# Patient Record
Sex: FEMALE | Race: AMERICAN INDIAN OR ALASKA NATIVE
[De-identification: names, ages, dates, MRNs, and addresses within clinical notes are randomized per-mention and may not be internally consistent; named-entity substitution may affect disease eponyms.]

---

## 2017-06-20 ENCOUNTER — HOSPITAL ENCOUNTER (EMERGENCY)
Dept: HOSPITAL 5 - ED | Age: 32
LOS: 1 days | Discharge: LEFT BEFORE DISCHARGE | End: 2017-06-21
Payer: SELF-PAY

## 2017-06-20 VITALS — DIASTOLIC BLOOD PRESSURE: 88 MMHG | SYSTOLIC BLOOD PRESSURE: 137 MMHG

## 2017-06-20 DIAGNOSIS — R41.9: ICD-10-CM

## 2017-06-20 DIAGNOSIS — Z53.21: ICD-10-CM

## 2017-06-20 DIAGNOSIS — R11.2: ICD-10-CM

## 2017-06-20 DIAGNOSIS — R10.9: Primary | ICD-10-CM

## 2017-06-20 LAB
ALBUMIN SERPL-MCNC: 4.2 G/DL (ref 3.9–5)
ALBUMIN/GLOB SERPL: 1.3 %
ALP SERPL-CCNC: 38 UNITS/L (ref 35–129)
ALT SERPL-CCNC: 6 UNITS/L (ref 7–56)
ANION GAP SERPL CALC-SCNC: 18 MMOL/L
BASOPHILS NFR BLD AUTO: 0.3 % (ref 0–1.8)
BILIRUB SERPL-MCNC: 0.4 MG/DL (ref 0.1–1.2)
BILIRUB UR QL STRIP: (no result)
BLOOD UR QL VISUAL: (no result)
BUN SERPL-MCNC: 8 MG/DL (ref 7–17)
BUN/CREAT SERPL: 13.33 %
CALCIUM SERPL-MCNC: 9.3 MG/DL (ref 8.4–10.2)
CHLORIDE SERPL-SCNC: 97.6 MMOL/L (ref 98–107)
CO2 SERPL-SCNC: 24 MMOL/L (ref 22–30)
EOSINOPHIL NFR BLD AUTO: 2.1 % (ref 0–4.3)
GLUCOSE SERPL-MCNC: 107 MG/DL (ref 65–100)
HCT VFR BLD CALC: 35 % (ref 30.3–42.9)
HGB BLD-MCNC: 11.3 GM/DL (ref 10.1–14.3)
KETONES UR STRIP-MCNC: 20 MG/DL
LEUKOCYTE ESTERASE UR QL STRIP: (no result)
LIPASE SERPL-CCNC: 39 UNITS/L (ref 13–60)
MCH RBC QN AUTO: 26 PG (ref 28–32)
MCHC RBC AUTO-ENTMCNC: 32 % (ref 30–34)
MCV RBC AUTO: 81 FL (ref 79–97)
MUCOUS THREADS #/AREA URNS HPF: (no result) /HPF
NITRITE UR QL STRIP: (no result)
PH UR STRIP: 6 [PH] (ref 5–7)
PLATELET # BLD: 209 K/MM3 (ref 140–440)
POTASSIUM SERPL-SCNC: 3.5 MMOL/L (ref 3.6–5)
PROT SERPL-MCNC: 7.5 G/DL (ref 6.3–8.2)
PROT UR STRIP-MCNC: (no result) MG/DL
RBC # BLD AUTO: 4.34 M/MM3 (ref 3.65–5.03)
RBC #/AREA URNS HPF: 7 /HPF (ref 0–6)
SODIUM SERPL-SCNC: 136 MMOL/L (ref 137–145)
UROBILINOGEN UR-MCNC: 2 MG/DL (ref ?–2)
WBC # BLD AUTO: 6.4 K/MM3 (ref 4.5–11)
WBC #/AREA URNS HPF: 2 /HPF (ref 0–6)

## 2017-06-20 PROCEDURE — 76705 ECHO EXAM OF ABDOMEN: CPT

## 2017-06-20 PROCEDURE — 83690 ASSAY OF LIPASE: CPT

## 2017-06-20 PROCEDURE — 80053 COMPREHEN METABOLIC PANEL: CPT

## 2017-06-20 PROCEDURE — 36415 COLL VENOUS BLD VENIPUNCTURE: CPT

## 2017-06-20 PROCEDURE — 84703 CHORIONIC GONADOTROPIN ASSAY: CPT

## 2017-06-20 PROCEDURE — 85025 COMPLETE CBC W/AUTO DIFF WBC: CPT

## 2017-06-20 PROCEDURE — 81001 URINALYSIS AUTO W/SCOPE: CPT

## 2017-06-20 NOTE — EMERGENCY DEPARTMENT REPORT
Chief Complaint: Abdominal Pain


Stated Complaint: ABD PAIN,CANNOT EAT,ANXIETY


Time Seen by Provider: 06/20/17 18:51





- HPI


History of Present Illness: 





pt c/o 1 month of abd pain and vomiting. 





- ROS


Review of Systems: 





+ n/v 


+ abd pain 





- Exam


Vital Signs: 


 Vital Signs











  06/20/17





  18:46


 


Temperature 98.7 F


 


Pulse Rate 84


 


Respiratory 18





Rate 


 


Blood Pressure 137/88


 


O2 Sat by Pulse 100





Oximetry 











Physical Exam: 





RUQ ttp


abd soft 


MSE screening note: 


Focused history and physical exam performed.


Due to findings the following was ordered:





labs, us 





ED Disposition for MSE


Condition: Stable


Instructions:  Abdominal Pain (ED)

## 2017-06-20 NOTE — ULTRASOUND REPORT
FINAL REPORT



EXAM:  US ABDOMEN LIMITED



HISTORY:  RUQ tenderness and vomiting 



TECHNIQUE:  Directed sonography of the right upper quadrant.



PRIORS:  None.



FINDINGS:  

Gallbladder is of normal size and echogenicity, with tiny and

echogenic, but nonshadowing focus noted along the dependent wall.

No other apparent echogenic or shadowing calculi. Wall thickness

within normal limits. Intra-and extrahepatic bile ducts are of

normal caliber. 



Liver has normal homogeneous echogenicity without focal

abnormalities.



Right kidney measures 10 cm in longest dimension and is grossly

unremarkable.



Visualized pancreatic parenchyma grossly unremarkable. 



IMPRESSION:  

1. No acute findings.

## 2017-06-22 ENCOUNTER — HOSPITAL ENCOUNTER (EMERGENCY)
Dept: HOSPITAL 5 - ED | Age: 32
Discharge: HOME | End: 2017-06-22
Payer: SELF-PAY

## 2017-06-22 VITALS — DIASTOLIC BLOOD PRESSURE: 75 MMHG | SYSTOLIC BLOOD PRESSURE: 114 MMHG

## 2017-06-22 DIAGNOSIS — Z88.2: ICD-10-CM

## 2017-06-22 DIAGNOSIS — Z88.5: ICD-10-CM

## 2017-06-22 DIAGNOSIS — R19.7: ICD-10-CM

## 2017-06-22 DIAGNOSIS — D64.9: ICD-10-CM

## 2017-06-22 DIAGNOSIS — F41.9: ICD-10-CM

## 2017-06-22 DIAGNOSIS — R10.11: Primary | ICD-10-CM

## 2017-06-22 DIAGNOSIS — F32.9: ICD-10-CM

## 2017-06-22 DIAGNOSIS — Z88.8: ICD-10-CM

## 2017-06-22 DIAGNOSIS — F17.200: ICD-10-CM

## 2017-06-22 DIAGNOSIS — R11.2: ICD-10-CM

## 2017-06-22 LAB
ALBUMIN SERPL-MCNC: 4.1 G/DL (ref 3.9–5)
ALBUMIN/GLOB SERPL: 1.4 %
ALP SERPL-CCNC: 40 UNITS/L (ref 35–129)
ALT SERPL-CCNC: 7 UNITS/L (ref 7–56)
ANION GAP SERPL CALC-SCNC: 18 MMOL/L
BASOPHILS NFR BLD AUTO: 0.3 % (ref 0–1.8)
BILIRUB SERPL-MCNC: 0.4 MG/DL (ref 0.1–1.2)
BILIRUB UR QL STRIP: (no result)
BLOOD UR QL VISUAL: (no result)
BUN SERPL-MCNC: 8 MG/DL (ref 7–17)
BUN/CREAT SERPL: 11.42 %
CALCIUM SERPL-MCNC: 9 MG/DL (ref 8.4–10.2)
CHLORIDE SERPL-SCNC: 100.4 MMOL/L (ref 98–107)
CO2 SERPL-SCNC: 22 MMOL/L (ref 22–30)
EOSINOPHIL NFR BLD AUTO: 4.2 % (ref 0–4.3)
GLUCOSE SERPL-MCNC: 95 MG/DL (ref 65–100)
HCT VFR BLD CALC: 35.3 % (ref 30.3–42.9)
HGB BLD-MCNC: 11.3 GM/DL (ref 10.1–14.3)
KETONES UR STRIP-MCNC: (no result) MG/DL
LEUKOCYTE ESTERASE UR QL STRIP: (no result)
LIPASE SERPL-CCNC: 47 UNITS/L (ref 13–60)
MCH RBC QN AUTO: 26 PG (ref 28–32)
MCHC RBC AUTO-ENTMCNC: 32 % (ref 30–34)
MCV RBC AUTO: 82 FL (ref 79–97)
MUCOUS THREADS #/AREA URNS HPF: (no result) /HPF
NITRITE UR QL STRIP: (no result)
PH UR STRIP: 6 [PH] (ref 5–7)
PLATELET # BLD: 210 K/MM3 (ref 140–440)
POTASSIUM SERPL-SCNC: 4.1 MMOL/L (ref 3.6–5)
PROT SERPL-MCNC: 7 G/DL (ref 6.3–8.2)
PROT UR STRIP-MCNC: (no result) MG/DL
RBC # BLD AUTO: 4.29 M/MM3 (ref 3.65–5.03)
RBC #/AREA URNS HPF: 3 /HPF (ref 0–6)
SODIUM SERPL-SCNC: 136 MMOL/L (ref 137–145)
UROBILINOGEN UR-MCNC: < 2 MG/DL (ref ?–2)
WBC # BLD AUTO: 5.6 K/MM3 (ref 4.5–11)
WBC #/AREA URNS HPF: 1 /HPF (ref 0–6)

## 2017-06-22 PROCEDURE — 81001 URINALYSIS AUTO W/SCOPE: CPT

## 2017-06-22 PROCEDURE — 85025 COMPLETE CBC W/AUTO DIFF WBC: CPT

## 2017-06-22 PROCEDURE — 80053 COMPREHEN METABOLIC PANEL: CPT

## 2017-06-22 PROCEDURE — 81025 URINE PREGNANCY TEST: CPT

## 2017-06-22 PROCEDURE — 99283 EMERGENCY DEPT VISIT LOW MDM: CPT

## 2017-06-22 PROCEDURE — 36415 COLL VENOUS BLD VENIPUNCTURE: CPT

## 2017-06-22 PROCEDURE — 83690 ASSAY OF LIPASE: CPT

## 2017-06-22 NOTE — EMERGENCY DEPARTMENT REPORT
ED General Adult HPI





- General


Chief complaint: Abdominal Pain


Stated complaint: ABD PAIN/ANXIETY/VOMITING


Time Seen by Provider: 06/22/17 20:30


Source: patient


Mode of arrival: Ambulatory


Limitations: No Limitations





- History of Present Illness


Initial comments: 





Patient comes into the ER today with complaints of abdominal pain for the past 

week.  Patient states that the pain is worse with any type of eating.  Patient 

describes the pain as crampy to sharp.  Patient states that anytime she eats 

anything that the food will go down but after anywhere from 5-30 minutes, the 

food will come right back up.  Patient states that she frequently has some 

diarrhea with her vomiting and abdominal pain as well.  Patient also notes that 

she has been having a lot of anxiety for the past week as well.  Patient states 

that she has made an appointment with mental health and is asking if she can 

have something for her panic attacks in the meantime.  Patient denies any 

suicidal or homicidal ideations.





- Related Data


 Previous Rx's











 Medication  Instructions  Recorded  Last Taken  Type


 


LORazepam [Ativan] 0.5 mg PO BID PRN #20 tab 06/22/17 Unknown Rx


 


Pantoprazole [Protonix] 40 mg PO QDAY #30 tablet 06/22/17 Unknown Rx


 


Promethazine [Phenergan TAB] 25 mg PO Q8HR PRN #25 tab 06/22/17 Unknown Rx











 Allergies











Allergy/AdvReac Type Severity Reaction Status Date / Time


 


codeine Allergy  Hives Verified 06/20/17 18:49


 


hydroxyzine HCl Allergy  Shortness Verified 06/20/17 18:50





[From Vistaril]   of Breath  


 


hydroxyzine pamoate Allergy  Shortness Verified 06/20/17 18:50





[From Vistaril]   of Breath  


 


methocarbamol [From Robaxin] Allergy  Shortness Verified 06/20/17 18:50





   of Breath  


 


sulfamethoxazole Allergy  Hives Verified 06/20/17 18:50





[From Bactrim]     


 


trimethoprim [From Bactrim] Allergy  Hives Verified 06/20/17 18:50














ED Review of Systems


ROS: 


Stated complaint: ABD PAIN/ANXIETY/VOMITING


Other details as noted in HPI





Constitutional: denies: chills, fever


Eyes: denies: eye pain, eye discharge, vision change


ENT: denies: ear pain, throat pain


Respiratory: denies: cough, shortness of breath, wheezing


Cardiovascular: denies: chest pain, palpitations


Endocrine: no symptoms reported


Gastrointestinal: abdominal pain, nausea, vomiting, diarrhea.  denies: 

constipation, hematemesis, melena, hematochezia


Genitourinary: denies: urgency, dysuria, discharge


Musculoskeletal: denies: back pain, joint swelling, arthralgia


Skin: denies: rash, lesions


Neurological: denies: headache, weakness, paresthesias


Psychiatric: anxiety.  denies: depression


Hematological/Lymphatic: denies: easy bleeding, easy bruising





ED Past Medical Hx





- Past Medical History


Previous Medical History?: Yes


Hx GERD: Yes


Hx Psychiatric Treatment: Yes (anxiety,depression)


Additional medical history: anemia





- Surgical History


Past Surgical History?: No





- Social History


Smoking Status: Current Every Day Smoker


Substance Use Type: Alcohol





- Medications


Home Medications: 


 Home Medications











 Medication  Instructions  Recorded  Confirmed  Last Taken  Type


 


LORazepam [Ativan] 0.5 mg PO BID PRN #20 tab 06/22/17  Unknown Rx


 


Pantoprazole [Protonix] 40 mg PO QDAY #30 tablet 06/22/17  Unknown Rx


 


Promethazine [Phenergan TAB] 25 mg PO Q8HR PRN #25 tab 06/22/17  Unknown Rx














ED Physical Exam





- General


Limitations: No Limitations


General appearance: alert, in no apparent distress





- Head


Head exam: Present: atraumatic, normocephalic





- Eye


Eye exam: Present: normal appearance





- ENT


ENT exam: Present: mucous membranes moist





- Neck


Neck exam: Present: normal inspection





- Respiratory


Respiratory exam: Present: normal lung sounds bilaterally.  Absent: respiratory 

distress





- Cardiovascular


Cardiovascular Exam: Present: regular rate, normal rhythm.  Absent: systolic 

murmur, diastolic murmur, rubs, gallop





- GI/Abdominal


GI/Abdominal exam: Present: soft, tenderness (right upper quadrant), normal 

bowel sounds.  Absent: guarding, rebound, hyperactive bowel sounds, hypoactive 

bowel sounds, organomegaly





- Extremities Exam


Extremities exam: Present: normal inspection





- Back Exam


Back exam: Present: normal inspection





- Neurological Exam


Neurological exam: Present: alert, oriented X3, CN II-XII intact





- Psychiatric


Psychiatric exam: Present: normal affect, normal mood, anxious





- Skin


Skin exam: Present: warm, dry, intact, normal color.  Absent: rash





ED Course


 Vital Signs











  06/22/17





  15:39


 


Temperature 98.5 F


 


Pulse Rate 63


 


Respiratory 20





Rate 


 


Blood Pressure 112/67


 


O2 Sat by Pulse 100





Oximetry 














ED Medical Decision Making





- Lab Data


Result diagrams: 


 06/22/17 15:54





 06/22/17 15:54








 Lab Results











  06/22/17 06/22/17 Range/Units





  15:54 15:54 


 


WBC  5.6   (4.5-11.0)  K/mm3


 


RBC  4.29   (3.65-5.03)  M/mm3


 


Hgb  11.3   (10.1-14.3)  gm/dl


 


Hct  35.3   (30.3-42.9)  %


 


MCV  82   (79-97)  fl


 


MCH  26 L   (28-32)  pg


 


MCHC  32   (30-34)  %


 


RDW  17.6 H   (13.2-15.2)  %


 


Plt Count  210   (140-440)  K/mm3


 


Lymph % (Auto)  34.1   (13.4-35.0)  %


 


Mono % (Auto)  9.4 H   (0.0-7.3)  %


 


Eos % (Auto)  4.2   (0.0-4.3)  %


 


Baso % (Auto)  0.3   (0.0-1.8)  %


 


Lymph #  1.9   (1.2-5.4)  K/mm3


 


Mono #  0.5   (0.0-0.8)  K/mm3


 


Eos #  0.2   (0.0-0.4)  K/mm3


 


Baso #  0.0   (0.0-0.1)  K/mm3


 


Seg Neutrophils %  52.0   (40.0-70.0)  %


 


Seg Neutrophils #  2.9   (1.8-7.7)  K/mm3


 


Sodium   136 L  (137-145)  mmol/L


 


Potassium   4.1  (3.6-5.0)  mmol/L


 


Chloride   100.4  ()  mmol/L


 


Carbon Dioxide   22  (22-30)  mmol/L


 


Anion Gap   18  mmol/L


 


BUN   8  (7-17)  mg/dL


 


Creatinine   0.7  (0.7-1.2)  mg/dL


 


Estimated GFR   > 60  ml/min


 


BUN/Creatinine Ratio   11.42  %


 


Glucose   95  ()  mg/dL


 


Calcium   9.0  (8.4-10.2)  mg/dL


 


Total Bilirubin   0.40  (0.1-1.2)  mg/dL


 


AST   13  (5-40)  units/L


 


ALT   7  (7-56)  units/L


 


Alkaline Phosphatase   40  ()  units/L


 


Total Protein   7.0  (6.3-8.2)  g/dL


 


Albumin   4.1  (3.9-5)  g/dL


 


Albumin/Globulin Ratio   1.4  %


 


Lipase   47  (13-60)  units/L














- Medical Decision Making





Patient is nontoxic and hemodynamically stable.  Patient was apparently here 2 

days ago and left prior to treatment but did apparently have an ultrasound of 

her gallbladder performed here in this ER.  Results obtained and reviewed with 

patient room today.  Ultrasound gallbladder was unremarkable.  Based on 

examination and history had to have some suspicion as to her symptoms being 

related to her gallbladder.  I will refer patient to gastroenterologists and 

encourage patient to keep her mental health evaluation.  Patient is in 

agreement with treatment plan the patient is stable for discharge.


Critical care attestation.: 


If time is entered above; I have spent that time in minutes in the direct care 

of this critically ill patient, excluding procedure time.








ED Disposition


Clinical Impression: 


 Right upper quadrant abdominal pain, Nausea vomiting and diarrhea, Anxiety





Disposition: DC-01 TO HOME OR SELFCARE


Is pt being admited?: No


Does the pt Need Aspirin: No


Condition: Good


Instructions:  Biliary Colic (ED), Abdominal Pain (ED), Anxiety (ED), 

Gastroesophageal Reflux Disease (ED)


Prescriptions: 


LORazepam [Ativan] 0.5 mg PO BID PRN #20 tab


 PRN Reason: Anxiety


Pantoprazole [Protonix] 40 mg PO QDAY #30 tablet


Promethazine [Phenergan TAB] 25 mg PO Q8HR PRN #25 tab


 PRN Reason: Nausea


Referrals: 


PRIMARY CARE,MD [Primary Care Provider] - 3-5 Days


[facility] [Provider Group] - 3-5 Days


Time of Disposition: 21:44

## 2017-06-24 NOTE — ED ELOPEMENT REVIEW
ED Pt Elopement review





- Results review


Lab results: 





 Laboratory Tests











  06/20/17 06/20/17 06/20/17





  18:53 18:53 18:53


 


WBC  6.4  


 


RBC  4.34  


 


Hgb  11.3  


 


Hct  35.0  


 


MCV  81  


 


MCH  26 L  


 


MCHC  32  


 


RDW  17.0 H  


 


Plt Count  209  


 


Lymph % (Auto)  36.3 H  


 


Mono % (Auto)  6.2  


 


Eos % (Auto)  2.1  


 


Baso % (Auto)  0.3  


 


Lymph #  2.3  


 


Mono #  0.4  


 


Eos #  0.1  


 


Baso #  0.0  


 


Seg Neutrophils %  55.1  


 


Seg Neutrophils #  3.5  


 


Sodium   136 L 


 


Potassium   3.5 L 


 


Chloride   97.6 L 


 


Carbon Dioxide   24 


 


Anion Gap   18 


 


BUN   8 


 


Creatinine   0.6 L 


 


Estimated GFR   > 60 


 


BUN/Creatinine Ratio   13.33 


 


Glucose   107 H 


 


Calcium   9.3 


 


Total Bilirubin   0.40 


 


AST   12 


 


ALT   6 L 


 


Alkaline Phosphatase   38 


 


Total Protein   7.5 


 


Albumin   4.2 


 


Albumin/Globulin Ratio   1.3 


 


Lipase   39 


 


HCG, Qual    Negative


 


Urine Color   


 


Urine Turbidity   


 


Urine pH   


 


Ur Specific Gravity   


 


Urine Protein   


 


Urine Glucose (UA)   


 


Urine Ketones   


 


Urine Blood   


 


Urine Nitrite   


 


Urine Bilirubin   


 


Urine Urobilinogen   


 


Ur Leukocyte Esterase   


 


Urine WBC (Auto)   


 


Urine RBC (Auto)   


 


U Epithel Cells (Auto)   


 


Urine Mucus   














  06/20/17





  19:31


 


WBC 


 


RBC 


 


Hgb 


 


Hct 


 


MCV 


 


MCH 


 


MCHC 


 


RDW 


 


Plt Count 


 


Lymph % (Auto) 


 


Mono % (Auto) 


 


Eos % (Auto) 


 


Baso % (Auto) 


 


Lymph # 


 


Mono # 


 


Eos # 


 


Baso # 


 


Seg Neutrophils % 


 


Seg Neutrophils # 


 


Sodium 


 


Potassium 


 


Chloride 


 


Carbon Dioxide 


 


Anion Gap 


 


BUN 


 


Creatinine 


 


Estimated GFR 


 


BUN/Creatinine Ratio 


 


Glucose 


 


Calcium 


 


Total Bilirubin 


 


AST 


 


ALT 


 


Alkaline Phosphatase 


 


Total Protein 


 


Albumin 


 


Albumin/Globulin Ratio 


 


Lipase 


 


HCG, Qual 


 


Urine Color  Yellow


 


Urine Turbidity  Clear


 


Urine pH  6.0


 


Ur Specific Gravity  1.019


 


Urine Protein  <15 mg/dl


 


Urine Glucose (UA)  Neg


 


Urine Ketones  20


 


Urine Blood  Neg


 


Urine Nitrite  Neg


 


Urine Bilirubin  Neg


 


Urine Urobilinogen  2.0


 


Ur Leukocyte Esterase  Neg


 


Urine WBC (Auto)  2.0


 


Urine RBC (Auto)  7.0


 


U Epithel Cells (Auto)  8.0


 


Urine Mucus  2+














- Call Back decision


Pt Call Back Decision: No action required

## 2017-06-29 ENCOUNTER — HOSPITAL ENCOUNTER (EMERGENCY)
Dept: HOSPITAL 5 - ED | Age: 32
Discharge: HOME | End: 2017-06-29
Payer: SELF-PAY

## 2017-06-29 VITALS — DIASTOLIC BLOOD PRESSURE: 70 MMHG | SYSTOLIC BLOOD PRESSURE: 108 MMHG

## 2017-06-29 DIAGNOSIS — F32.9: ICD-10-CM

## 2017-06-29 DIAGNOSIS — K21.9: ICD-10-CM

## 2017-06-29 DIAGNOSIS — T65.91XA: Primary | ICD-10-CM

## 2017-06-29 DIAGNOSIS — R51: ICD-10-CM

## 2017-06-29 DIAGNOSIS — Z88.5: ICD-10-CM

## 2017-06-29 DIAGNOSIS — F17.200: ICD-10-CM

## 2017-06-29 DIAGNOSIS — F41.9: ICD-10-CM
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
## 2017-06-29 DIAGNOSIS — D64.9: ICD-10-CM

## 2017-06-29 DIAGNOSIS — F12.90: ICD-10-CM

## 2017-06-29 LAB
ANION GAP SERPL CALC-SCNC: 17 MMOL/L
BASOPHILS NFR BLD AUTO: 0.5 % (ref 0–1.8)
BILIRUB UR QL STRIP: (no result)
BLOOD UR QL VISUAL: (no result)
BUN SERPL-MCNC: 10 MG/DL (ref 7–17)
BUN/CREAT SERPL: 12.5 %
CALCIUM SERPL-MCNC: 9.6 MG/DL (ref 8.4–10.2)
CHLORIDE SERPL-SCNC: 101.6 MMOL/L (ref 98–107)
CO2 SERPL-SCNC: 26 MMOL/L (ref 22–30)
EOSINOPHIL NFR BLD AUTO: 3.4 % (ref 0–4.3)
GLUCOSE SERPL-MCNC: 85 MG/DL (ref 65–100)
HCT VFR BLD CALC: 36.5 % (ref 30.3–42.9)
HGB BLD-MCNC: 11.7 GM/DL (ref 10.1–14.3)
KETONES UR STRIP-MCNC: (no result) MG/DL
LEUKOCYTE ESTERASE UR QL STRIP: (no result)
MCH RBC QN AUTO: 27 PG (ref 28–32)
MCHC RBC AUTO-ENTMCNC: 32 % (ref 30–34)
MCV RBC AUTO: 83 FL (ref 79–97)
MUCOUS THREADS #/AREA URNS HPF: (no result) /HPF
NITRITE UR QL STRIP: (no result)
PH UR STRIP: 7 [PH] (ref 5–7)
PLATELET # BLD: 182 K/MM3 (ref 140–440)
POTASSIUM SERPL-SCNC: 4.5 MMOL/L (ref 3.6–5)
PROT UR STRIP-MCNC: (no result) MG/DL
RBC # BLD AUTO: 4.38 M/MM3 (ref 3.65–5.03)
RBC #/AREA URNS HPF: 2 /HPF (ref 0–6)
SODIUM SERPL-SCNC: 140 MMOL/L (ref 137–145)
URINE DRUGS OF ABUSE NOTE: (no result)
UROBILINOGEN UR-MCNC: < 2 MG/DL (ref ?–2)
WBC # BLD AUTO: 5.1 K/MM3 (ref 4.5–11)
WBC #/AREA URNS HPF: < 1 /HPF (ref 0–6)

## 2017-06-29 PROCEDURE — 80307 DRUG TEST PRSMV CHEM ANLYZR: CPT

## 2017-06-29 PROCEDURE — 93005 ELECTROCARDIOGRAM TRACING: CPT

## 2017-06-29 PROCEDURE — 81001 URINALYSIS AUTO W/SCOPE: CPT

## 2017-06-29 PROCEDURE — 80048 BASIC METABOLIC PNL TOTAL CA: CPT

## 2017-06-29 PROCEDURE — 96361 HYDRATE IV INFUSION ADD-ON: CPT

## 2017-06-29 PROCEDURE — 99284 EMERGENCY DEPT VISIT MOD MDM: CPT

## 2017-06-29 PROCEDURE — 85025 COMPLETE CBC W/AUTO DIFF WBC: CPT

## 2017-06-29 PROCEDURE — 36415 COLL VENOUS BLD VENIPUNCTURE: CPT

## 2017-06-29 PROCEDURE — 93010 ELECTROCARDIOGRAM REPORT: CPT

## 2017-06-29 PROCEDURE — G0480 DRUG TEST DEF 1-7 CLASSES: HCPCS

## 2017-06-29 PROCEDURE — 80320 DRUG SCREEN QUANTALCOHOLS: CPT

## 2017-06-29 PROCEDURE — 81025 URINE PREGNANCY TEST: CPT

## 2017-06-29 PROCEDURE — 96374 THER/PROPH/DIAG INJ IV PUSH: CPT

## 2017-06-29 NOTE — EMERGENCY DEPARTMENT REPORT
ED Allergic Reaction HPI





- General


Chief complaint: Allergic Reaction


Stated complaint: DIZZINESS/LIGHTHEADED


Time Seen by Provider: 06/29/17 21:46


Source: patient, EMS


Mode of arrival: Ambulatory


Limitations: No Limitations





- History of Present Illness


Initial Comments: 





Pt is a 32 yr old F with a h/o anxiety who presents after an adverse reaction 

to taking presumed xanax. Pt reports she was having some anxiety when she asked 

her friend for a 1/2 tab of xanax. Pt reports after she took this tab she did 

not feel how she usually does after taking xanax. Pt reported jitters, nausea, 

and the opposite of what she usually feels. Pt reports she used to take xanax 

2mg BID for a while 2 years ago, but has not taken any in a long time. Pt is 

not sure where this xanax came from. Otherwise no fevers, chills, HA, dizziness

, vomiting, CP, SOB, abd pain, travel, or sick contacts.





- Related Data


 Previous Rx's











 Medication  Instructions  Recorded  Last Taken  Type


 


LORazepam [Ativan] 0.5 mg PO BID PRN #20 tab 06/22/17 Unknown Rx


 


Ondansetron [Zofran Odt] 4 mg PO TID PRN #25 tab.rapdis 06/22/17 Unknown Rx


 


Pantoprazole [Protonix] 40 mg PO QDAY #30 tablet 06/22/17 Unknown Rx


 


Promethazine [Phenergan TAB] 25 mg PO Q8HR PRN #25 tab 06/22/17 Unknown Rx











 Allergies











Allergy/AdvReac Type Severity Reaction Status Date / Time


 


codeine Allergy  Hives Verified 06/20/17 18:49


 


hydroxyzine HCl Allergy  Shortness Verified 06/20/17 18:50





[From Vistaril]   of Breath  


 


hydroxyzine pamoate Allergy  Shortness Verified 06/20/17 18:50





[From Vistaril]   of Breath  


 


methocarbamol [From Robaxin] Allergy  Shortness Verified 06/20/17 18:50





   of Breath  


 


sulfamethoxazole Allergy  Hives Verified 06/20/17 18:50





[From Bactrim]     


 


trimethoprim [From Bactrim] Allergy  Hives Verified 06/20/17 18:50














ED Review of Systems


ROS: 


Stated complaint: DIZZINESS/LIGHTHEADED


Other details as noted in HPI





Comment: All other systems reviewed and negative





ED Past Medical Hx





- Past Medical History


Previous Medical History?: Yes


Hx GERD: Yes


Hx Psychiatric Treatment: Yes (anxiety,depression)


Additional medical history: anemia





- Surgical History


Past Surgical History?: No





- Social History


Smoking Status: Current Every Day Smoker


Substance Use Type: Marijuana, Prescribed, Tranquilizers





- Medications


Home Medications: 


 Home Medications











 Medication  Instructions  Recorded  Confirmed  Last Taken  Type


 


LORazepam [Ativan] 0.5 mg PO BID PRN #20 tab 06/22/17  Unknown Rx


 


Ondansetron [Zofran Odt] 4 mg PO TID PRN #25 tab.rapdis 06/22/17  Unknown Rx


 


Pantoprazole [Protonix] 40 mg PO QDAY #30 tablet 06/22/17  Unknown Rx


 


Promethazine [Phenergan TAB] 25 mg PO Q8HR PRN #25 tab 06/22/17  Unknown Rx














ED Physical Exam





- General


Limitations: No Limitations


General appearance: alert, in no apparent distress





- Head


Head exam: Present: atraumatic, normocephalic





- Eye


Eye exam: Present: normal appearance





- ENT


ENT exam: Present: mucous membranes moist





- Neck


Neck exam: Present: normal inspection





- Respiratory


Respiratory exam: Present: normal lung sounds bilaterally.  Absent: respiratory 

distress





- Cardiovascular


Cardiovascular Exam: Present: regular rate, normal rhythm.  Absent: systolic 

murmur, diastolic murmur, rubs, gallop





- GI/Abdominal


GI/Abdominal exam: Present: soft, normal bowel sounds





- Extremities Exam


Extremities exam: Present: normal inspection





- Back Exam


Back exam: Present: normal inspection





- Neurological Exam


Neurological exam: Present: alert, oriented X3





- Psychiatric


Psychiatric exam: Present: normal affect, normal mood





- Skin


Skin exam: Present: warm, dry, intact, normal color.  Absent: rash





ED Course


 Vital Signs











  06/29/17 06/29/17





  18:42 21:42


 


Temperature 98.7 F 98.2 F


 


Pulse Rate 68 65


 


Respiratory 16 18





Rate  


 


Blood Pressure 116/74 


 


Blood Pressure  116/71





[Left]  


 


O2 Sat by Pulse 100 100





Oximetry  














ED Medical Decision Making





- Lab Data


Result diagrams: 


 06/29/17 19:03





 06/29/17 19:03





- EKG Data


-: EKG Interpreted by Me





- EKG Data





06/29/17 22:05


EKG 2159, normal sinus rhythm with sinus arrhythmia at 76 bpm, QTc 465 ms, 

normal axis, no LVH, no ST changes, no STEMI





- Medical Decision Making





Instructed patient to refrain from taking other people's medications





Given tylenol 1gm for HA


Critical care attestation.: 


If time is entered above; I have spent that time in minutes in the direct care 

of this critically ill patient, excluding procedure time.








ED Disposition


Clinical Impression: 


 Accidental overdose, Headache





Disposition: DC-01 TO HOME OR SELFCARE


Is pt being admited?: No


Condition: Stable


Instructions:  Benzodiazepine Abuse (ED), Acute Headache (ED)


Referrals: 


PRIMARY CARE,MD [Primary Care Provider] - 3-5 Days

## 2018-05-25 ENCOUNTER — HOSPITAL ENCOUNTER (EMERGENCY)
Dept: HOSPITAL 5 - ED | Age: 33
LOS: 1 days | Discharge: HOME | End: 2018-05-26
Payer: SELF-PAY

## 2018-05-25 DIAGNOSIS — K21.9: ICD-10-CM

## 2018-05-25 DIAGNOSIS — R10.10: ICD-10-CM

## 2018-05-25 DIAGNOSIS — R11.2: Primary | ICD-10-CM

## 2018-05-25 LAB
ALBUMIN SERPL-MCNC: 4.6 G/DL (ref 3.9–5)
ALT SERPL-CCNC: 7 UNITS/L (ref 7–56)
BASOPHILS # (AUTO): 0 K/MM3 (ref 0–0.1)
BASOPHILS NFR BLD AUTO: 0.3 % (ref 0–1.8)
BUN SERPL-MCNC: 11 MG/DL (ref 7–17)
BUN/CREAT SERPL: 18 %
CALCIUM SERPL-MCNC: 9.5 MG/DL (ref 8.4–10.2)
EOSINOPHIL # BLD AUTO: 0.2 K/MM3 (ref 0–0.4)
EOSINOPHIL NFR BLD AUTO: 3.4 % (ref 0–4.3)
HCT VFR BLD CALC: 32.3 % (ref 30.3–42.9)
HEMOLYSIS INDEX: 1
HGB BLD-MCNC: 10.4 GM/DL (ref 10.1–14.3)
LYMPHOCYTES # BLD AUTO: 2.7 K/MM3 (ref 1.2–5.4)
LYMPHOCYTES NFR BLD AUTO: 48.9 % (ref 13.4–35)
MCH RBC QN AUTO: 26 PG (ref 28–32)
MCHC RBC AUTO-ENTMCNC: 32 % (ref 30–34)
MCV RBC AUTO: 81 FL (ref 79–97)
MONOCYTES # (AUTO): 0.5 K/MM3 (ref 0–0.8)
MONOCYTES % (AUTO): 8.8 % (ref 0–7.3)
PLATELET # BLD: 220 K/MM3 (ref 140–440)
RBC # BLD AUTO: 4 M/MM3 (ref 3.65–5.03)

## 2018-05-25 PROCEDURE — 96374 THER/PROPH/DIAG INJ IV PUSH: CPT

## 2018-05-25 PROCEDURE — 81001 URINALYSIS AUTO W/SCOPE: CPT

## 2018-05-25 PROCEDURE — 96361 HYDRATE IV INFUSION ADD-ON: CPT

## 2018-05-25 PROCEDURE — 80053 COMPREHEN METABOLIC PANEL: CPT

## 2018-05-25 PROCEDURE — 84703 CHORIONIC GONADOTROPIN ASSAY: CPT

## 2018-05-25 PROCEDURE — 76705 ECHO EXAM OF ABDOMEN: CPT

## 2018-05-25 PROCEDURE — 99284 EMERGENCY DEPT VISIT MOD MDM: CPT

## 2018-05-25 PROCEDURE — 83690 ASSAY OF LIPASE: CPT

## 2018-05-25 PROCEDURE — 85025 COMPLETE CBC W/AUTO DIFF WBC: CPT

## 2018-05-25 PROCEDURE — 36415 COLL VENOUS BLD VENIPUNCTURE: CPT

## 2018-05-25 PROCEDURE — 74022 RADEX COMPL AQT ABD SERIES: CPT

## 2018-05-25 NOTE — EMERGENCY DEPARTMENT REPORT
HPI





- General


Chief Complaint: Abdominal Pain


Time Seen by Provider: 05/25/18 23:29





- HPI


HPI: 


33-year-old demented female presents to the emergency department from home with 

complaint of a 3 to four-day history of upper abdominal pain along with nausea 

and vomiting.  The patient says that when she is eating she feels short of 

breath but it stops as soon as she has done and she says it feels like the food 

sits in the middle of her chest.  She has been trying to take Zantac 150 mg 

twice daily for her symptoms without much relief.  She does have a history of 

GERD and says that she has set up to see a gastroenterologist this coming 

Tuesday, in 5 days.  No recent travel or sick contacts at home.  She denies any 

fever, back pain, cough, dysuria, vaginal bleeding or discharge.  She is still 

currently slightly nauseated.  She says that her upper abdominal pain is a 6 

out of 10 in intensity.








ED Past Medical Hx





- Past Medical History


Previous Medical History?: Yes


Hx GERD: Yes


Hx Psychiatric Treatment: Yes (anxiety,depression)


Additional medical history: anemia





- Surgical History


Past Surgical History?: No





- Social History


Smoking Status: Never Smoker


Substance Use Type: None





- Medications


Home Medications: 


 Home Medications











 Medication  Instructions  Recorded  Confirmed  Last Taken  Type


 


LORazepam [Ativan] 0.5 mg PO BID PRN #20 tab 06/22/17  Unknown Rx


 


Ondansetron [Zofran Odt] 4 mg PO TID PRN #25 tab.rapdis 06/22/17  Unknown Rx


 


Pantoprazole [Protonix] 40 mg PO QDAY #30 tablet 06/22/17  Unknown Rx


 


Promethazine [Phenergan TAB] 25 mg PO Q8HR PRN #25 tab 06/22/17  Unknown Rx


 


Ondansetron [Zofran Odt] 4 mg PO Q8H PRN #10 tab.rapdis 05/26/18  Unknown Rx














ED Review of Systems


ROS: 


Stated complaint: N/V


Other details as noted in HPI





Comment: All other systems reviewed and negative


Constitutional: denies: chills, fever


Eyes: denies: eye pain, eye discharge, vision change


ENT: denies: ear pain, throat pain


Respiratory: denies: cough, wheezing


Cardiovascular: denies: chest pain, palpitations


Gastrointestinal: abdominal pain, nausea, vomiting


Genitourinary: denies: urgency, dysuria, discharge


Musculoskeletal: denies: back pain, joint swelling, arthralgia


Skin: denies: rash, lesions


Neurological: denies: headache, weakness, paresthesias





Physical Exam





- Physical Exam


Vital Signs: 


 Vital Signs











  05/25/18





  22:34


 


Temperature 98.4 F


 


Pulse Rate 66


 


Respiratory 18





Rate 


 


Blood Pressure 117/74


 


O2 Sat by Pulse 99





Oximetry 











Physical Exam: 


GENERAL: The patient is well-developed well-nourished.


HENT: Normocephalic.  Atraumatic.    Patient has moist mucous membranes.


EYES: Extraocular motions are intact.  Pupils equal reactive to light 

bilaterally.


NECK: Supple.  Trachea is midline.


CHEST/LUNGS: Clear to auscultation.  There is no respiratory distress noted.


HEART/CARDIOVASCULAR: Regular.  There is no tachycardia.  There is no murmur.


ABDOMEN: Abdomen is soft.  There is some upper abdominal tenderness to 

palpation.  No guarding or rebound tenderness.  Patient has normal bowel 

sounds.  There is no abdominal distention.


SKIN: Skin is warm and dry.


NEURO: The patient is awake, alert, and oriented.  The patient is cooperative.  

The patient has no focal neurologic deficits.  The patient has normal speech.


MUSCULOSKELETAL: There is no tenderness or deformity.  There is no limitation 

range of motion.  There is no evidence of acute injury.








ED Course


 Vital Signs











  05/25/18





  22:34


 


Temperature 98.4 F


 


Pulse Rate 66


 


Respiratory 18





Rate 


 


Blood Pressure 117/74


 


O2 Sat by Pulse 99





Oximetry 














ED Medical Decision Making





- Lab Data


Result diagrams: 


 05/25/18 22:48





 05/25/18 22:48





- Radiology Data


Radiology results: report reviewed, image reviewed


interpreted by me: 


Chest x-ray does not show any acute process.  There are no pleural effusions, 

obvious pneumonia and there is no pneumothorax.





Abdominal x-ray shows nonspecific nonobstructive bowel gas.





Abdominal ultrasound shows no cholelithiasis or evidence of cholecystitis.  

There is no biliary ductal dilatation.  The pancreas was normal as visualized 

with suboptimal depiction of the pancreatic tail.








- Medical Decision Making





Patient appears with the complaint of some nausea and vomiting and upper 

abdominal pain.  During her ED course she started complaining of some right 

upper quadrant and/or lateral abdominal pain.  She was given antiemetic 

medication and eventually she was given a tramadol for her discomfort.  Labs 

were unremarkable and did not show any etiology of her symptoms.  Normal chest 

and abdominal x-rays.  Ultrasound did not show any cholelithiasis, cholecystitis

, biliary ductal dilation, or any signs of abnormalities of the pancreas 

despite a mild elevation in the lipase level.  Since patient still complained 

of some sharp right lateral abdominal pains, I suggested/encouraged a CT scan 

of the abdomen and pelvis to be done.  However the patient did not want this 

imaging done at this time.  She has good follow-up with gastroenterology coming 

up next week.  She was discharged home with antiemetics and encouraged to 

return to the emergency Department with any worsening of her symptoms or any 

acute distress.





- Differential Diagnosis


pancreatitis, cholecystitis, cholelithiasis, GERD


Critical Care Time: No


Critical care attestation.: 


If time is entered above; I have spent that time in minutes in the direct care 

of this critically ill patient, excluding procedure time.








ED Disposition


Clinical Impression: 


Nausea & vomiting


Qualifiers:


 Vomiting type: unspecified Vomiting Intractability: non-intractable Qualified 

Code(s): R11.2 - Nausea with vomiting, unspecified





Abdominal pain


Qualifiers:


 Abdominal location: unspecified location Qualified Code(s): R10.9 - 

Unspecified abdominal pain





Disposition: DC-01 TO HOME OR SELFCARE


Is pt being admited?: No


Condition: Stable


Instructions:  Acute Nausea and Vomiting (ED), Abdominal Pain (ED)


Additional Instructions: 


Please follow up with your primary care physician as well as with your 

previously scheduled appointment with gastroenterology.  Return to the 

emergency Department with any worsening of your symptoms or any acute distress.


Prescriptions: 


Ondansetron [Zofran Odt] 4 mg PO Q8H PRN #10 tab.rapdis


 PRN Reason: Nausea


Referrals: 


PRIMARY CARE,MD [Primary Care Provider] - 3-5 Days


Time of Disposition: 03:57

## 2018-05-26 VITALS — SYSTOLIC BLOOD PRESSURE: 104 MMHG | DIASTOLIC BLOOD PRESSURE: 64 MMHG

## 2018-05-26 LAB
BACTERIA #/AREA URNS HPF: (no result) /HPF
BILIRUB UR QL STRIP: (no result)
BLOOD UR QL VISUAL: (no result)
HYALINE CASTS #/AREA URNS LPF: 1 /LPF
MUCOUS THREADS #/AREA URNS HPF: (no result) /HPF
PH UR STRIP: 5 [PH] (ref 5–7)
PROT UR STRIP-MCNC: (no result) MG/DL
RBC #/AREA URNS HPF: 2 /HPF (ref 0–6)
UROBILINOGEN UR-MCNC: < 2 MG/DL (ref ?–2)
WBC #/AREA URNS HPF: 1 /HPF (ref 0–6)

## 2018-05-26 NOTE — XRAY REPORT
FINAL REPORT



PROCEDURE:  XR ABD SERIES W CXR 1V



TECHNIQUE:  Abdominal series complete, including supine and

upright AP views of the abdomen and frontal chest. 



HISTORY:  Abd pain 



COMPARISON:  No prior studies are available for comparison.



FINDINGS:  

Heart: Normal.



Mediastinum/Vessels: Normal.



Lungs/Pleural space: Normal.



Bowel gas pattern: Nonobstructive.



Masses or calcifications: None.



Bony structures: No acute osseous abnormality.



Other: No free intraperitoneal air.



IMPRESSION:  

No acute abnormality.

## 2018-05-26 NOTE — ULTRASOUND REPORT
FINAL REPORT



PROCEDURE:  US ABDOMEN LIMITED



TECHNIQUE:  Real-time sonography in multiple planes of the

gallbladder fossa and CBD with imaging of the adjacent liver,

pancreas, and right kidney was performed with image

documentation. CPT 40160







HISTORY:  Upper abd pain 



COMPARISON:  No prior studies are available for comparison.



FINDINGS:  

Liver: Normal size and echotexture with no evidence of cystic or

solid mass lesion.



Gallbladder: Gallbladder is contracted. There are no stones.

There is no wall thickening. There is minimal pericholecystic

fluid which is nonspecific..



Intrahepatic bile ducts: Normal . 



Extrahepatic bile ducts: Normal .



Pancreas: Normal as visualized with suboptimal depiction of the

pancreatic tail.



Right kidney: Normal echotexture. No focal renal mass, calculus,

or hydronephrosis.



Other: No free fluid.







IMPRESSION:  

There is no cholelithiasis or evidence of cholecystitis. There is

no biliary ductal dilatation.

## 2019-01-08 ENCOUNTER — HOSPITAL ENCOUNTER (EMERGENCY)
Dept: HOSPITAL 5 - ED | Age: 34
Discharge: HOME | End: 2019-01-08
Payer: SELF-PAY

## 2019-01-08 VITALS — SYSTOLIC BLOOD PRESSURE: 122 MMHG | DIASTOLIC BLOOD PRESSURE: 81 MMHG

## 2019-01-08 DIAGNOSIS — F32.9: ICD-10-CM

## 2019-01-08 DIAGNOSIS — K21.9: ICD-10-CM

## 2019-01-08 DIAGNOSIS — F41.9: ICD-10-CM

## 2019-01-08 DIAGNOSIS — Z88.5: ICD-10-CM

## 2019-01-08 DIAGNOSIS — Z88.4: ICD-10-CM

## 2019-01-08 DIAGNOSIS — J40: Primary | ICD-10-CM

## 2019-01-08 DIAGNOSIS — Z88.8: ICD-10-CM

## 2019-01-08 DIAGNOSIS — Z88.2: ICD-10-CM

## 2019-01-08 DIAGNOSIS — D64.9: ICD-10-CM

## 2019-01-08 DIAGNOSIS — F12.10: ICD-10-CM

## 2019-01-08 DIAGNOSIS — F17.200: ICD-10-CM

## 2019-01-08 PROCEDURE — 94640 AIRWAY INHALATION TREATMENT: CPT

## 2019-01-08 PROCEDURE — 71046 X-RAY EXAM CHEST 2 VIEWS: CPT

## 2019-01-13 ENCOUNTER — HOSPITAL ENCOUNTER (EMERGENCY)
Dept: HOSPITAL 5 - ED | Age: 34
Discharge: HOME | End: 2019-01-13
Payer: SELF-PAY

## 2019-01-13 VITALS — DIASTOLIC BLOOD PRESSURE: 65 MMHG | SYSTOLIC BLOOD PRESSURE: 125 MMHG

## 2019-01-13 DIAGNOSIS — K21.9: ICD-10-CM

## 2019-01-13 DIAGNOSIS — F17.200: ICD-10-CM

## 2019-01-13 DIAGNOSIS — F32.9: ICD-10-CM

## 2019-01-13 DIAGNOSIS — B37.3: Primary | ICD-10-CM

## 2019-01-13 LAB
BACTERIA #/AREA URNS HPF: (no result) /HPF
BILIRUB UR QL STRIP: (no result)
BLOOD UR QL VISUAL: (no result)
MUCOUS THREADS #/AREA URNS HPF: (no result) /HPF
PH UR STRIP: 6 [PH] (ref 5–7)
PROT UR STRIP-MCNC: (no result) MG/DL
RBC #/AREA URNS HPF: 7 /HPF (ref 0–6)
UROBILINOGEN UR-MCNC: < 2 MG/DL (ref ?–2)
WBC #/AREA URNS HPF: 9 /HPF (ref 0–6)

## 2019-01-13 PROCEDURE — 81025 URINE PREGNANCY TEST: CPT

## 2019-01-13 PROCEDURE — 81001 URINALYSIS AUTO W/SCOPE: CPT

## 2019-01-13 NOTE — EMERGENCY DEPARTMENT REPORT
ED Female  HPI





- General


Chief complaint: Urogenital-Female


Stated complaint: VAGINAL DISCHARGE/ABD PAIN


Time Seen by Provider: 01/13/19 13:20


Source: patient


Mode of arrival: Ambulatory


Limitations: No Limitations





- History of Present Illness


MD Complaint: vaginal discharge, dysuria, pelvic pain


Location: suprapubic


Severity: mild


Quality: cramping


Improves with: none


Are you Pregnant Now?: No





- Related Data


                                  Previous Rx's











 Medication  Instructions  Recorded  Last Taken  Type


 


LORazepam [Ativan] 0.5 mg PO BID PRN #20 tab 06/22/17 Unknown Rx


 


Ondansetron [Zofran Odt] 4 mg PO TID PRN #25 tab.rapdis 06/22/17 Unknown Rx


 


Promethazine [Phenergan TAB] 25 mg PO Q8HR PRN #25 tab 06/22/17 Unknown Rx


 


Loperamide [Imodium] 2 mg PO Q2HR #15 capsule 06/22/18 Unknown Rx


 


Mag Hydrox/Aluminum Hyd/Simeth 30 ml PO QID PRN #1 bottle 06/22/18 Unknown Rx





[Maalox Advanced Suspension]    


 


Ondansetron [Zofran Odt] 4 mg PO Q8H PRN #20 tab.rapdis 06/22/18 Unknown Rx


 


Fluconazole [Diflucan TAB] 150 mg PO ONCE #1 tablet 09/08/18 Unknown Rx


 


HYDROcodone/APAP 5-325 [[place] 1 - 2 each PO Q6HR PRN #10 tablet 09/08/18 Unknown

 Rx





5/325]    


 


ALBUTEROL Inhaler(NF) [VENTOLIN 1 puff IH PRN #1 inha 12/04/18 Unknown Rx





Inhaler(NF)]    


 


Dextromethorphan HBr [Tussin Cough] 15 mg PO TID 7 Days #1 bottle 12/04/18 

Unknown Rx


 


Ibuprofen [Motrin 800 MG tab] 800 mg PO Q8HR PRN #20 tablet 12/04/18 Unknown Rx


 


predniSONE [Deltasone] 10 mg PO QDAY #4 tab 12/04/18 Unknown Rx


 


ALBUTEROL Inhaler(NF) [VENTOLIN 2 puff IH Q4HR #1 inha 01/08/19 Unknown Rx





Inhaler(NF)]    


 


Benzonatate [Tessalon Perles] 100 mg PO Q8HR PRN #20 capsule 01/08/19 Unknown Rx


 


Hydrocodone/Chlorphen P-Stirex 115 ml PO Q12HR PRN #180 cassie.er.12h 01/08/19 

Unknown Rx





[Tussionex Pennkinetic Susp]    


 


predniSONE [Deltasone] 20 mg PO QDAY #15 tab 01/08/19 Unknown Rx











                                    Allergies











Allergy/AdvReac Type Severity Reaction Status Date / Time


 


codeine Allergy  Hives Verified 06/20/17 18:49


 


hydroxyzine HCl Allergy  Shortness Verified 06/20/17 18:50





[From Vistaril]   of Breath  


 


hydroxyzine pamoate Allergy  Shortness Verified 06/20/17 18:50





[From Vistaril]   of Breath  


 


methocarbamol [From Robaxin] Allergy  Shortness Verified 06/20/17 18:50





   of Breath  


 


sulfamethoxazole Allergy  Hives Verified 06/20/17 18:50





[From Bactrim]     


 


trimethoprim [From Bactrim] Allergy  Hives Verified 06/20/17 18:50














ED Review of Systems


ROS: 


Stated complaint: VAGINAL DISCHARGE/ABD PAIN


Other details as noted in HPI





Comment: All other systems reviewed and negative


Constitutional: denies: chills, fever


Respiratory: denies: cough, orthopnea, shortness of breath, SOB with exertion, 

SOB at rest


Cardiovascular: denies: chest pain, palpitations, dyspnea on exertion, orthopnea


Gastrointestinal: denies: abdominal pain, nausea, vomiting, diarrhea, 

constipation, hematemesis


Genitourinary: dysuria, discharge (WHITISH,THICK AND ITCHY)





ED Past Medical Hx





- Past Medical History


Hx GERD: Yes


Hx Psychiatric Treatment: Yes (anxiety,depression)


Additional medical history: anemia, GERD





- Surgical History


Past Surgical History?: No





- Social History


Smoking Status: Current Every Day Smoker


Substance Use Type: None





- Medications


Home Medications: 


                                Home Medications











 Medication  Instructions  Recorded  Confirmed  Last Taken  Type


 


LORazepam [Ativan] 0.5 mg PO BID PRN #20 tab 06/22/17  Unknown Rx


 


Ondansetron [Zofran Odt] 4 mg PO TID PRN #25 tab.rapdis 06/22/17  Unknown Rx


 


Promethazine [Phenergan TAB] 25 mg PO Q8HR PRN #25 tab 06/22/17  Unknown Rx


 


Loperamide [Imodium] 2 mg PO Q2HR #15 capsule 06/22/18  Unknown Rx


 


Mag Hydrox/Aluminum Hyd/Simeth 30 ml PO QID PRN #1 bottle 06/22/18  Unknown Rx





[Maalox Advanced Suspension]     


 


Ondansetron [Zofran Odt] 4 mg PO Q8H PRN #20 tab.rapdis 06/22/18  Unknown Rx


 


Fluconazole [Diflucan TAB] 150 mg PO ONCE #1 tablet 09/08/18  Unknown Rx


 


HYDROcodone/APAP 5-325 [[place] 1 - 2 each PO Q6HR PRN #10 tablet 09/08/18  

Unknown Rx





5/325]     


 


ALBUTEROL Inhaler(NF) [VENTOLIN 1 puff IH PRN #1 inha 12/04/18  Unknown Rx





Inhaler(NF)]     


 


Dextromethorphan HBr [Tussin Cough] 15 mg PO TID 7 Days #1 bottle 12/04/18  

Unknown Rx


 


Ibuprofen [Motrin 800 MG tab] 800 mg PO Q8HR PRN #20 tablet 12/04/18  Unknown Rx


 


predniSONE [Deltasone] 10 mg PO QDAY #4 tab 12/04/18  Unknown Rx


 


ALBUTEROL Inhaler(NF) [VENTOLIN 2 puff IH Q4HR #1 inha 01/08/19  Unknown Rx





Inhaler(NF)]     


 


Benzonatate [Tessalon Perles] 100 mg PO Q8HR PRN #20 capsule 01/08/19  Unknown 

Rx


 


Hydrocodone/Chlorphen P-Stirex 115 ml PO Q12HR PRN #180 cassie.er.12h 01/08/19  

Unknown Rx





[Tussionex Pennkinetic Susp]     


 


predniSONE [Deltasone] 20 mg PO QDAY #15 tab 01/08/19  Unknown Rx














ED Physical Exam





- General


Limitations: No Limitations


General appearance: alert, in no apparent distress





- Head


Head exam: Present: atraumatic, normocephalic, normal inspection





- ENT


ENT exam: Present: normal exam, normal orophraynx, mucous membranes moist





- Neck


Neck exam: Present: normal inspection, full ROM.  Absent: tenderness, 

meningismus, lymphadenopathy, thyromegaly





- Respiratory


Respiratory exam: Present: normal lung sounds bilaterally





- Cardiovascular


Cardiovascular Exam: Present: regular rate, normal rhythm, normal heart sounds





- GI/Abdominal


GI/Abdominal exam: Present: soft, normal bowel sounds.  Absent: distended, 

tenderness, guarding, rebound, rigid, organomegaly, mass, bruit, pulsatile mass,

 hernia





- Extremities Exam


Extremities exam: Present: normal inspection, full ROM, normal capillary refill.

  Absent: pedal edema, calf tenderness





- Back Exam


Back exam: Present: normal inspection, full ROM.  Absent: tenderness, CVA 

tenderness (R), CVA tenderness (L), muscle spasm, paraspinal tenderness, 

vertebral tenderness





- Neurological Exam


Neurological exam: Present: alert, oriented X3, CN II-XII intact, normal gait, 

reflexes normal





- Skin


Skin exam: Present: warm, intact, normal color





ED Course


                                   Vital Signs











  01/13/19





  11:57


 


Temperature 98.2 F


 


Pulse Rate 89


 


Respiratory 16





Rate 


 


Blood Pressure 125/65


 


O2 Sat by Pulse 99





Oximetry 











Critical care attestation.: 


If time is entered above; I have spent that time in minutes in the direct care 

of this critically ill patient, excluding procedure time.








ED Disposition


Clinical Impression: 


 Candida vaginitis





Disposition: DC-01 TO HOME OR SELFCARE


Is pt being admited?: No


Condition: Stable


Instructions:  Vulvovaginal Candidiasis (ED)


Referrals: 


PRIMARY CARE,MD [Primary Care Provider] - 3-5 Days

## 2019-03-04 ENCOUNTER — HOSPITAL ENCOUNTER (EMERGENCY)
Dept: HOSPITAL 5 - ED | Age: 34
Discharge: HOME | End: 2019-03-04
Payer: COMMERCIAL

## 2019-03-04 VITALS — DIASTOLIC BLOOD PRESSURE: 85 MMHG | SYSTOLIC BLOOD PRESSURE: 121 MMHG

## 2019-03-04 DIAGNOSIS — R06.02: Primary | ICD-10-CM

## 2019-03-04 DIAGNOSIS — R06.2: ICD-10-CM

## 2019-03-04 DIAGNOSIS — Z87.891: ICD-10-CM

## 2019-03-04 DIAGNOSIS — F32.9: ICD-10-CM

## 2019-03-04 DIAGNOSIS — F41.9: ICD-10-CM

## 2019-03-04 DIAGNOSIS — K21.9: ICD-10-CM

## 2019-03-04 PROCEDURE — 71046 X-RAY EXAM CHEST 2 VIEWS: CPT

## 2019-03-04 PROCEDURE — 99283 EMERGENCY DEPT VISIT LOW MDM: CPT

## 2019-03-04 PROCEDURE — 94640 AIRWAY INHALATION TREATMENT: CPT

## 2019-03-04 NOTE — XRAY REPORT
ROUTINE CHEST, TWO VIEWS:



HISTORY:  Wheezing.



The trachea, heart, mediastinal contour, lung fields and bony thorax 

are unremarkable. No significant change since 2/10/19.



IMPRESSION:

Unremarkable chest x-ray.

## 2019-03-04 NOTE — EMERGENCY DEPARTMENT REPORT
ED General Adult HPI





- General


Chief complaint: Upper Respiratory Infection


Stated complaint: WHEEZING/SOB


Time Seen by Provider: 03/04/19 08:41


Source: patient


Mode of arrival: Ambulatory


Limitations: No Limitations





- History of Present Illness


Initial comments: 





The patient presents unresponsive chief complaint of wheezing and shortness of 

breath 3 months.  The patient states this started in mid December which was 

diagnosed with acute bronchitis which later turned into chronic bronchitis.  The

patient states prior to her original diagnosis she was a daily smoker but no 

longer smokes.  Patient denies any chemical exposures.  The patient does have a 

primary care physician a [facility] via [facility] but has not 

followed up with them since this has occurred.  Patient denies any chest pain, 

abdominal pain, headache.


-: Gradual


Severity scale (0 -10): 0


Improves with: none


Worsens with: none


Associated Symptoms: denies other symptoms


Treatments Prior to Arrival: none





- Related Data


                                  Previous Rx's











 Medication  Instructions  Recorded  Last Taken  Type


 


LORazepam [Ativan] 0.5 mg PO BID PRN #20 tab 06/22/17 Unknown Rx


 


Ondansetron [Zofran Odt] 4 mg PO TID PRN #25 tab.rapdis 06/22/17 Unknown Rx


 


Promethazine [Phenergan TAB] 25 mg PO Q8HR PRN #25 tab 06/22/17 Unknown Rx


 


Loperamide [Imodium] 2 mg PO Q2HR #15 capsule 06/22/18 Unknown Rx


 


Mag Hydrox/Aluminum Hyd/Simeth 30 ml PO QID PRN #1 bottle 06/22/18 Unknown Rx





[Maalox Advanced Suspension]    


 


Ondansetron [Zofran Odt] 4 mg PO Q8H PRN #20 tab.rapdis 06/22/18 Unknown Rx


 


Fluconazole [Diflucan TAB] 150 mg PO ONCE #1 tablet 09/08/18 Unknown Rx


 


HYDROcodone/APAP 5-325 [[place] 1 - 2 each PO Q6HR PRN #10 tablet 09/08/18 Unknown

 Rx





5/325]    


 


ALBUTEROL Inhaler(NF) [VENTOLIN 1 puff IH PRN #1 inha 12/04/18 Unknown Rx





Inhaler(NF)]    


 


Dextromethorphan HBr [Tussin Cough] 15 mg PO TID 7 Days #1 bottle 12/04/18 

Unknown Rx


 


Ibuprofen [Motrin 800 MG tab] 800 mg PO Q8HR PRN #20 tablet 12/04/18 Unknown Rx


 


predniSONE [Deltasone] 10 mg PO QDAY #4 tab 12/04/18 Unknown Rx


 


Benzonatate [Tessalon Perles] 100 mg PO Q8HR PRN #20 capsule 01/08/19 Unknown Rx


 


Hydrocodone/Chlorphen P-Stirex 115 ml PO Q12HR PRN #180 cassie.er.12h 01/08/19 

Unknown Rx





[Tussionex Pennkinetic Susp]    


 


Fluconazole [Diflucan TAB] 100 mg PO BID #2 tablet 01/13/19 Unknown Rx


 


ALBUTEROL Inhaler(NF) [VENTOLIN 2 puff IH Q4HR #1 inha 02/10/19 Unknown Rx





Inhaler(NF)]    


 


Azithromycin [Zithromax Z-KIRSTY] 250 mg PO DAILY #6 tablet 02/10/19 Unknown Rx


 


Brompheniramine/Pseudoephed/Dm 10 ml PO Q4-6H PRN #200 syrup 02/10/19 Unknown Rx





[Bromfed Dm Cough Syrup]    


 


Diclofenac Sodium 50 mg PO BID #20 tablet. 02/10/19 Unknown Rx


 


predniSONE [Deltasone] 20 mg PO DAILY #15 tab 02/10/19 Unknown Rx


 


Albuterol Sulfate [Proventil Hfa] 6.7 gm IH BID #1 hfa.aer.ad 03/04/19 Unknown 

Rx











                                    Allergies











Allergy/AdvReac Type Severity Reaction Status Date / Time


 


codeine Allergy  Hives Verified 06/20/17 18:49


 


hydroxyzine HCl Allergy  Shortness Verified 06/20/17 18:50





[From Vistaril]   of Breath  


 


hydroxyzine pamoate Allergy  Shortness Verified 06/20/17 18:50





[From Vistaril]   of Breath  


 


methocarbamol [From Robaxin] Allergy  Shortness Verified 06/20/17 18:50





   of Breath  


 


sulfamethoxazole Allergy  Hives Verified 06/20/17 18:50





[From Bactrim]     


 


trimethoprim [From Bactrim] Allergy  Hives Verified 06/20/17 18:50














ED Review of Systems


ROS: 


Stated complaint: WHEEZING/SOB


Other details as noted in HPI





Comment: All other systems reviewed and negative


Constitutional: denies: chills, fever


Eyes: denies: eye pain, eye discharge, vision change


ENT: denies: ear pain, throat pain


Respiratory: cough, wheezing.  denies: shortness of breath


Cardiovascular: denies: chest pain, palpitations


Endocrine: no symptoms reported


Gastrointestinal: denies: abdominal pain, nausea, diarrhea


Genitourinary: denies: urgency, dysuria, discharge


Musculoskeletal: denies: back pain, joint swelling, arthralgia


Skin: denies: rash, lesions


Neurological: denies: headache, weakness, paresthesias


Psychiatric: denies: anxiety, depression


Hematological/Lymphatic: denies: easy bleeding, easy bruising





ED Past Medical Hx





- Past Medical History


Previous Medical History?: Yes


Hx GERD: Yes


Hx Psychiatric Treatment: Yes (anxiety,depression)


Additional medical history: anemia, GERD





- Surgical History


Past Surgical History?: No





- Social History


Smoking Status: Former Smoker


Substance Use Type: Alcohol





- Medications


Home Medications: 


                                Home Medications











 Medication  Instructions  Recorded  Confirmed  Last Taken  Type


 


LORazepam [Ativan] 0.5 mg PO BID PRN #20 tab 06/22/17  Unknown Rx


 


Ondansetron [Zofran Odt] 4 mg PO TID PRN #25 tab.rapdis 06/22/17  Unknown Rx


 


Promethazine [Phenergan TAB] 25 mg PO Q8HR PRN #25 tab 06/22/17  Unknown Rx


 


Loperamide [Imodium] 2 mg PO Q2HR #15 capsule 06/22/18  Unknown Rx


 


Mag Hydrox/Aluminum Hyd/Simeth 30 ml PO QID PRN #1 bottle 06/22/18  Unknown Rx





[Maalox Advanced Suspension]     


 


Ondansetron [Zofran Odt] 4 mg PO Q8H PRN #20 tab.rapdis 06/22/18  Unknown Rx


 


Fluconazole [Diflucan TAB] 150 mg PO ONCE #1 tablet 09/08/18  Unknown Rx


 


HYDROcodone/APAP 5-325 [[place] 1 - 2 each PO Q6HR PRN #10 tablet 09/08/18  

Unknown Rx





5/325]     


 


ALBUTEROL Inhaler(NF) [VENTOLIN 1 puff IH PRN #1 inha 12/04/18  Unknown Rx





Inhaler(NF)]     


 


Dextromethorphan HBr [Tussin Cough] 15 mg PO TID 7 Days #1 bottle 12/04/18  

Unknown Rx


 


Ibuprofen [Motrin 800 MG tab] 800 mg PO Q8HR PRN #20 tablet 12/04/18  Unknown Rx


 


predniSONE [Deltasone] 10 mg PO QDAY #4 tab 12/04/18  Unknown Rx


 


Benzonatate [Tessalon Perles] 100 mg PO Q8HR PRN #20 capsule 01/08/19  Unknown 

Rx


 


Hydrocodone/Chlorphen P-Stirex 115 ml PO Q12HR PRN #180 cassie.er.12h 01/08/19  

Unknown Rx





[Tussionex Pennkinetic Susp]     


 


Fluconazole [Diflucan TAB] 100 mg PO BID #2 tablet 01/13/19  Unknown Rx


 


ALBUTEROL Inhaler(NF) [VENTOLIN 2 puff IH Q4HR #1 inha 02/10/19  Unknown Rx





Inhaler(NF)]     


 


Azithromycin [Zithromax Z-KIRSTY] 250 mg PO DAILY #6 tablet 02/10/19  Unknown Rx


 


Brompheniramine/Pseudoephed/Dm 10 ml PO Q4-6H PRN #200 syrup 02/10/19  Unknown 

Rx





[Bromfed Dm Cough Syrup]     


 


Diclofenac Sodium 50 mg PO BID #20 tablet.dr 02/10/19  Unknown Rx


 


predniSONE [Deltasone] 20 mg PO DAILY #15 tab 02/10/19  Unknown Rx


 


Albuterol Sulfate [Proventil Hfa] 6.7 gm IH BID #1 hfa.aer.ad 03/04/19  Unknown 

Rx














ED Physical Exam





- General


Limitations: No Limitations


General appearance: alert, in no apparent distress





- Head


Head exam: Present: atraumatic, normocephalic





- Eye


Eye exam: Present: normal appearance, PERRL, EOMI





- ENT


ENT exam: Present: mucous membranes moist





- Neck


Neck exam: Present: normal inspection





- Respiratory


Respiratory exam: Present: normal lung sounds bilaterally, wheezes (mild expi

ratory wheezing).  Absent: respiratory distress, rales, rhonchi





- Cardiovascular


Cardiovascular Exam: Present: regular rate, normal rhythm.  Absent: systolic mur

mur, diastolic murmur, rubs, gallop





- GI/Abdominal


GI/Abdominal exam: Present: soft, normal bowel sounds.  Absent: distended, 

tenderness





- Extremities Exam


Extremities exam: Present: normal inspection





- Back Exam


Back exam: Present: normal inspection





- Neurological Exam


Neurological exam: Present: alert, oriented X3, CN II-XII intact.  Absent: motor

sensory deficit





- Psychiatric


Psychiatric exam: Present: normal affect, normal mood





- Skin


Skin exam: Present: warm, dry, intact, normal color.  Absent: rash





ED Course


                                   Vital Signs











  03/04/19





  08:33


 


Temperature 98.1 F


 


Pulse Rate 105 H


 


Respiratory 20





Rate 


 


Blood Pressure 121/85


 


O2 Sat by Pulse 98





Oximetry 














ED Medical Decision Making





- Medical Decision Making





Discussed with the patient the need to follow with her primary care physician at

[facility].  We discussed with wheezing 3 months the patient should be seen by her 

primary care physician and possibly have pulmonary studies done.


Critical care attestation.: 


If time is entered above; I have spent that time in minutes in the direct care 

of this critically ill patient, excluding procedure time.








ED Disposition


Clinical Impression: 


 Shortness of breath





Disposition: DC-01 TO HOME OR SELFCARE


Is pt being admited?: No


Does the pt Need Aspirin: No


Condition: Stable


Instructions:  Dyspnea (ED)


Additional Instructions: 


return if worse


As discussed please follow up with your primary care physician [facility]


Prescriptions: 


Albuterol Sulfate [Proventil Hfa] 6.7 gm IH BID #1 hfa.aer.ad


Referrals: 


[facility], [place] [Primary Care Provider] - 3-5 Days


Time of Disposition: 09:49

## 2020-07-01 ENCOUNTER — CLAIMS CREATED FROM THE CLAIM WINDOW (OUTPATIENT)
Dept: URBAN - METROPOLITAN AREA TELEHEALTH 2 | Facility: TELEHEALTH | Age: 35
End: 2020-07-01
Payer: COMMERCIAL

## 2020-07-01 ENCOUNTER — LAB OUTSIDE AN ENCOUNTER (OUTPATIENT)
Dept: URBAN - METROPOLITAN AREA TELEHEALTH 2 | Facility: TELEHEALTH | Age: 35
End: 2020-07-01

## 2020-07-01 DIAGNOSIS — R13.13 PHARYNGEAL DYSPHAGIA: ICD-10-CM

## 2020-07-01 DIAGNOSIS — R19.4 CHANGE IN BOWEL HABIT: ICD-10-CM

## 2020-07-01 DIAGNOSIS — K21.0 GERD WITH ESOPHAGITIS: ICD-10-CM

## 2020-07-01 PROCEDURE — 99204 OFFICE O/P NEW MOD 45 MIN: CPT | Performed by: INTERNAL MEDICINE

## 2020-07-01 PROCEDURE — 99214 OFFICE O/P EST MOD 30 MIN: CPT | Performed by: INTERNAL MEDICINE

## 2020-07-01 RX ORDER — DEXLANSOPRAZOLE 60 MG/1
1 CAPSULE CAPSULE, DELAYED RELEASE ORAL ONCE A DAY
Qty: 90 | Refills: 3 | OUTPATIENT
Start: 2020-07-01

## 2020-07-01 RX ORDER — METOCLOPRAMIDE HYDROCHLORIDE 10 MG/1
TAKE 1 TABLET (10 MG) BY ORAL ROUTE 4 TIMES PER DAY 30 MINUTES BEFORE MEALS AND AT BEDTIME TABLET ORAL
Qty: 120 | Refills: 2 | Status: DISCONTINUED | COMMUNITY
Start: 2016-11-02

## 2020-07-01 NOTE — HPI-OTHER HISTORIES
The pt has a longstanding history of GERD who now presents for evalution of heartburn and indigestion and intermittetnt difficulty swallowing. The pt notes that for breakfast she will eat eggs, toast and cereal. She will drink water gatorade and OJ. For lunch, pt will have a vegan sandwich and will drink ensure. Fro dinner pt will have a vegan based dish.

## 2020-07-01 NOTE — PHYSICAL EXAM GASTROINTESTINAL
Abdomen , soft, tenderness in midepigastric pain Willie Gordon 07/01/2020 11:54:46 AM EDT > , , nondistended , no guarding or rigidity , no masses palpable , normal bowel sounds , Liver and Spleen , no hepatomegaly present , no hepatosplenomegaly , liver nontender , spleen not palpable

## 2020-08-05 ENCOUNTER — OFFICE VISIT (OUTPATIENT)
Dept: URBAN - METROPOLITAN AREA MEDICAL CENTER 33 | Facility: MEDICAL CENTER | Age: 35
End: 2020-08-05
Payer: COMMERCIAL

## 2020-08-05 DIAGNOSIS — K29.60 ADENOPAPILLOMATOSIS GASTRICA: ICD-10-CM

## 2020-08-05 DIAGNOSIS — K22.2 ACQUIRED ESOPHAGEAL RING: ICD-10-CM

## 2020-08-05 PROCEDURE — 43239 EGD BIOPSY SINGLE/MULTIPLE: CPT | Performed by: INTERNAL MEDICINE

## 2020-08-05 PROCEDURE — 43249 ESOPH EGD DILATION <30 MM: CPT | Performed by: INTERNAL MEDICINE

## 2020-08-05 RX ORDER — DEXLANSOPRAZOLE 60 MG/1
1 CAPSULE CAPSULE, DELAYED RELEASE ORAL ONCE A DAY
Qty: 90 | Refills: 3 | Status: ACTIVE | COMMUNITY
Start: 2020-07-01

## 2020-08-09 ENCOUNTER — WEB ENCOUNTER (OUTPATIENT)
Dept: URBAN - METROPOLITAN AREA CLINIC 17 | Facility: CLINIC | Age: 35
End: 2020-08-09

## 2020-08-10 ENCOUNTER — OFFICE VISIT (OUTPATIENT)
Dept: URBAN - METROPOLITAN AREA CLINIC 17 | Facility: CLINIC | Age: 35
End: 2020-08-10
Payer: COMMERCIAL

## 2020-08-10 DIAGNOSIS — K21.0 GERD WITH ESOPHAGITIS: ICD-10-CM

## 2020-08-10 DIAGNOSIS — R19.4 CHANGE IN BOWEL HABIT: ICD-10-CM

## 2020-08-10 DIAGNOSIS — R68.81 EARLY SATIETY: ICD-10-CM

## 2020-08-10 DIAGNOSIS — E55.9 VITAMIN D DEFICIENCY DISEASE: ICD-10-CM

## 2020-08-10 DIAGNOSIS — R13.13 PHARYNGEAL DYSPHAGIA: ICD-10-CM

## 2020-08-10 PROBLEM — 129851009: Status: ACTIVE | Noted: 2020-07-01

## 2020-08-10 PROBLEM — 442076002: Status: ACTIVE | Noted: 2020-08-10

## 2020-08-10 PROBLEM — 21101000119105: Status: ACTIVE | Noted: 2020-07-01

## 2020-08-10 PROCEDURE — 99214 OFFICE O/P EST MOD 30 MIN: CPT | Performed by: INTERNAL MEDICINE

## 2020-08-10 RX ORDER — DILTIAZEM HYDROCHLORIDE 30 MG/1
ONE TABLET TABLET, FILM COATED ORAL DAILY
Qty: 90 | Refills: 3 | OUTPATIENT
Start: 2020-08-10

## 2020-08-10 RX ORDER — DEXLANSOPRAZOLE 60 MG/1
1 CAPSULE CAPSULE, DELAYED RELEASE ORAL ONCE A DAY
Qty: 90 | Refills: 3 | Status: ACTIVE | COMMUNITY
Start: 2020-07-01

## 2020-08-10 NOTE — HPI-OTHER HISTORIES
The pt notes that she has significant hearburn and indigestion who presents for fu office visit. Of note, the pt is s/p EGD 8/5/20 revealing multiple esophageal strictures and gastritis. and LA grade B esophagitis. Pt is currently taking Dexlinat 60 mg po BID and she is also taking her supplements. She is having regular BM's. She is having classic early satieity problems.

## 2020-08-26 ENCOUNTER — TELEPHONE ENCOUNTER (OUTPATIENT)
Dept: URBAN - METROPOLITAN AREA CLINIC 92 | Facility: CLINIC | Age: 35
End: 2020-08-26

## 2020-09-07 ENCOUNTER — TELEPHONE ENCOUNTER (OUTPATIENT)
Dept: URBAN - METROPOLITAN AREA CLINIC 92 | Facility: CLINIC | Age: 35
End: 2020-09-07

## 2021-01-04 ENCOUNTER — OFFICE VISIT (OUTPATIENT)
Dept: URBAN - METROPOLITAN AREA CLINIC 17 | Facility: CLINIC | Age: 36
End: 2021-01-04

## 2021-06-21 ENCOUNTER — OFFICE VISIT (OUTPATIENT)
Dept: URBAN - METROPOLITAN AREA CLINIC 17 | Facility: CLINIC | Age: 36
End: 2021-06-21

## 2021-06-21 RX ORDER — DILTIAZEM HYDROCHLORIDE 30 MG/1
ONE TABLET TABLET, FILM COATED ORAL DAILY
Qty: 90 | Refills: 3 | COMMUNITY
Start: 2020-08-10

## 2021-06-21 RX ORDER — DEXLANSOPRAZOLE 60 MG/1
1 CAPSULE CAPSULE, DELAYED RELEASE ORAL ONCE A DAY
Qty: 90 | Refills: 3 | COMMUNITY
Start: 2020-07-01

## 2021-07-20 ENCOUNTER — HOSPITAL ENCOUNTER (EMERGENCY)
Dept: HOSPITAL 5 - ED | Age: 36
Discharge: HOME | End: 2021-07-20
Payer: COMMERCIAL

## 2021-07-20 DIAGNOSIS — Z79.899: ICD-10-CM

## 2021-07-20 DIAGNOSIS — J45.909: Primary | ICD-10-CM

## 2021-07-20 DIAGNOSIS — R05: ICD-10-CM

## 2021-07-20 DIAGNOSIS — Z88.8: ICD-10-CM

## 2021-07-20 DIAGNOSIS — Z79.2: ICD-10-CM

## 2021-07-20 DIAGNOSIS — K21.9: ICD-10-CM

## 2021-07-20 DIAGNOSIS — Z87.891: ICD-10-CM

## 2021-07-20 DIAGNOSIS — F41.9: ICD-10-CM

## 2021-07-20 DIAGNOSIS — Z79.1: ICD-10-CM

## 2021-07-20 PROCEDURE — 99283 EMERGENCY DEPT VISIT LOW MDM: CPT

## 2021-07-20 PROCEDURE — 71046 X-RAY EXAM CHEST 2 VIEWS: CPT

## 2021-07-20 PROCEDURE — 94640 AIRWAY INHALATION TREATMENT: CPT

## 2021-07-20 NOTE — XRAY REPORT
CHEST 2 VIEWS 



INDICATION / CLINICAL INFORMATION: wheeze and couh.



COMPARISON: Chest radiograph 3/4/2019



FINDINGS:



SUPPORT DEVICES: None.

HEART / MEDIASTINUM: No significant abnormality. 

LUNGS / PLEURA: No significant pulmonary or pleural abnormality. No pneumothorax. 



ADDITIONAL FINDINGS: No significant additional findings.



IMPRESSION:

1. No acute findings.



Signer Name: Bandar Carlos MD 

Signed: 7/20/2021 9:08 PM

Workstation Name: [id number]

## 2021-07-20 NOTE — EMERGENCY DEPARTMENT REPORT
ED Asthma HPI





- General


Chief Complaint: Upper Respiratory Infection


Stated Complaint: VOMITINB COUGH WHEEZING


Time Seen by Provider: 07/20/21 20:20


Source: patient


Mode of arrival: Ambulatory


Limitations: No Limitations





- History of Present Illness


Initial Comments: 





36-year-old American female with a known history of asthma takes albuterol and 

Advair at home presents emerged department complaining of a 10-day history of 

coughing and and congestion with wheezing with yellow-green mucus discharge and 

coughing spells that resolves and posttussive vomiting.  Ports no fever, chills,

sweats, palpitations no no fever, no abdominal pain no rashes no odynophagia or 

dysphagia.  No foreign travel no known contact with coronavirus


MD Complaint: shortness of breath, wheezing


-: Gradual, days(s) (10)


Severity: mild


Context: none known


Associated Symptoms: none


Treatments Prior to Arrival: inhaled bronchodilator, inhaled steroid





- Related Data


                                  Previous Rx's











 Medication  Instructions  Recorded  Last Taken  Type


 


LORazepam [Ativan] 0.5 mg PO BID PRN #20 tab 06/22/17 Unknown Rx


 


Ondansetron [Zofran Odt] 4 mg PO TID PRN #25 tab.rapdis 06/22/17 Unknown Rx


 


Promethazine [Phenergan TAB] 25 mg PO Q8HR PRN #25 tab 06/22/17 Unknown Rx


 


Loperamide [Imodium] 2 mg PO Q2HR #15 capsule 06/22/18 Unknown Rx


 


Mag Hydrox/Aluminum Hyd/Simeth 30 ml PO QID PRN #1 bottle 06/22/18 Unknown Rx





[Maalox Advanced Suspension]    


 


Ondansetron [Zofran Odt] 4 mg PO Q8H PRN #20 tab.rapdis 06/22/18 Unknown Rx


 


Fluconazole (Nf) [Diflucan TAB] 150 mg PO ONCE #1 tablet 09/08/18 Unknown Rx


 


HYDROcodone/APAP 5-325 [[place] 1 - 2 each PO Q6HR PRN #10 tablet 09/08/18 Unknown

 Rx





5/325]    


 


ALBUTEROL Inhaler(NF) [VENTOLIN 1 puff IH PRN #1 inha 12/04/18 Unknown Rx





Inhaler(NF)]    


 


Dextromethorphan HBr [Tussin Cough] 15 mg PO TID 7 Days #1 bottle 12/04/18 

Unknown Rx


 


Ibuprofen [Motrin 800 MG tab] 800 mg PO Q8HR PRN #20 tablet 12/04/18 Unknown Rx


 


predniSONE 10 mg PO QDAY #4 tab 12/04/18 Unknown Rx


 


Benzonatate [Tessalon Perles] 100 mg PO Q8HR PRN #20 capsule 01/08/19 Unknown Rx


 


Hydrocodone/Chlorphen P-Stirex 115 ml PO Q12HR PRN #180 cassie.er.12h 01/08/19 

Unknown Rx





[Tussionex Pennkinetic Susp]    


 


Fluconazole [Diflucan TAB] 100 mg PO BID #2 tablet 01/13/19 Unknown Rx


 


ALBUTEROL Inhaler(NF) [VENTOLIN 2 puff IH Q4HR #1 inha 02/10/19 Unknown Rx





Inhaler(NF)]    


 


Azithromycin [Zithromax Z-KIRSTY] 250 mg PO DAILY #6 tablet 02/10/19 Unknown Rx


 


Brompheniramine/Pseudoephed/Dm 10 ml PO Q4-6H PRN #200 syrup 02/10/19 Unknown Rx





[Bromfed Dm 2-30-10 mg/5 ml Syr]    


 


Diclofenac Sodium 50 mg PO BID #20 tablet.dr 02/10/19 Unknown Rx


 


predniSONE [Deltasone] 20 mg PO DAILY #15 tab 02/10/19 Unknown Rx


 


Albuterol Sulfate [Proventil Hfa] 6.7 gm IH BID #1 hfa.aer.ad 03/04/19 Unknown 

Rx


 


predniSONE [Deltasone] 20 mg PO DAILY #15 tablet 03/04/19 Unknown Rx


 


Azithromycin [Zithromax] 500 mg PO QDAY #3 tablet 07/20/21 Unknown Rx


 


Benzonatate [Tessalon Perles] 100 mg PO Q8HR #30 capsule 07/20/21 Unknown Rx


 


Montelukast [Singulair] 10 mg PO QPM #14 tablet 07/20/21 Unknown Rx


 


predniSONE [Deltasone] 50 mg PO QDAY #5 tab 07/20/21 Unknown Rx











                                    Allergies











Allergy/AdvReac Type Severity Reaction Status Date / Time


 


codeine Allergy  Hives Verified 06/20/17 18:49


 


hydroxyzine HCl Allergy  Shortness Verified 06/20/17 18:50





[From Vistaril]   of Breath  


 


hydroxyzine pamoate Allergy  Shortness Verified 06/20/17 18:50





[From Vistaril]   of Breath  


 


methocarbamol [From Robaxin] Allergy  Shortness Verified 06/20/17 18:50





   of Breath  


 


sulfamethoxazole Allergy  Hives Verified 06/20/17 18:50





[From Bactrim]     


 


trimethoprim [From Bactrim] Allergy  Hives Verified 06/20/17 18:50














ED Review of Systems


ROS: 


Stated complaint: VOMITINB COUGH WHEEZING


Other details as noted in HPI





Comment: All other systems reviewed and negative





ED Past Medical Hx





- Past Medical History


Hx GERD: Yes


Hx Psychiatric Treatment: Yes (anxiety,depression)


Hx Asthma: Yes


Additional medical history: anemia, GERD





- Surgical History


Past Surgical History?: No





- Social History


Smoking Status: Former Smoker


Substance Use Type: Alcohol





- Medications


Home Medications: 


                                Home Medications











 Medication  Instructions  Recorded  Confirmed  Last Taken  Type


 


LORazepam [Ativan] 0.5 mg PO BID PRN #20 tab 06/22/17  Unknown Rx


 


Ondansetron [Zofran Odt] 4 mg PO TID PRN #25 tab.rapdis 06/22/17  Unknown Rx


 


Promethazine [Phenergan TAB] 25 mg PO Q8HR PRN #25 tab 06/22/17  Unknown Rx


 


Loperamide [Imodium] 2 mg PO Q2HR #15 capsule 06/22/18  Unknown Rx


 


Mag Hydrox/Aluminum Hyd/Simeth 30 ml PO QID PRN #1 bottle 06/22/18  Unknown Rx





[Maalox Advanced Suspension]     


 


Ondansetron [Zofran Odt] 4 mg PO Q8H PRN #20 tab.rapdis 06/22/18  Unknown Rx


 


Fluconazole (Nf) [Diflucan TAB] 150 mg PO ONCE #1 tablet 09/08/18  Unknown Rx


 


HYDROcodone/APAP 5-325 [[place] 1 - 2 each PO Q6HR PRN #10 tablet 09/08/18  

Unknown Rx





5/325]     


 


ALBUTEROL Inhaler(NF) [VENTOLIN 1 puff IH PRN #1 inha 12/04/18  Unknown Rx





Inhaler(NF)]     


 


Dextromethorphan HBr [Tussin Cough] 15 mg PO TID 7 Days #1 bottle 12/04/18  

Unknown Rx


 


Ibuprofen [Motrin 800 MG tab] 800 mg PO Q8HR PRN #20 tablet 12/04/18  Unknown Rx


 


predniSONE 10 mg PO QDAY #4 tab 12/04/18  Unknown Rx


 


Benzonatate [Tessalon Perles] 100 mg PO Q8HR PRN #20 capsule 01/08/19  Unknown 

Rx


 


Hydrocodone/Chlorphen P-Stirex 115 ml PO Q12HR PRN #180 cassie.er.12h 01/08/19  

Unknown Rx





[Tussionex Pennkinetic Susp]     


 


Fluconazole [Diflucan TAB] 100 mg PO BID #2 tablet 01/13/19  Unknown Rx


 


ALBUTEROL Inhaler(NF) [VENTOLIN 2 puff IH Q4HR #1 inha 02/10/19  Unknown Rx





Inhaler(NF)]     


 


Azithromycin [Zithromax Z-KIRSTY] 250 mg PO DAILY #6 tablet 02/10/19  Unknown Rx


 


Brompheniramine/Pseudoephed/Dm 10 ml PO Q4-6H PRN #200 syrup 02/10/19  Unknown 

Rx





[Bromfed Dm 2-30-10 mg/5 ml Syr]     


 


Diclofenac Sodium 50 mg PO BID #20 tablet.dr 02/10/19  Unknown Rx


 


predniSONE [Deltasone] 20 mg PO DAILY #15 tab 02/10/19  Unknown Rx


 


Albuterol Sulfate [Proventil Hfa] 6.7 gm IH BID #1 hfa.aer.ad 03/04/19  Unknown 

Rx


 


predniSONE [Deltasone] 20 mg PO DAILY #15 tablet 03/04/19  Unknown Rx


 


Azithromycin [Zithromax] 500 mg PO QDAY #3 tablet 07/20/21  Unknown Rx


 


Benzonatate [Tessalon Perles] 100 mg PO Q8HR #30 capsule 07/20/21  Unknown Rx


 


Montelukast [Singulair] 10 mg PO QPM #14 tablet 07/20/21  Unknown Rx


 


predniSONE [Deltasone] 50 mg PO QDAY #5 tab 07/20/21  Unknown Rx














ED Physical Exam





- General


Limitations: No Limitations


General appearance: alert, in no apparent distress





- Head


Head exam: Present: atraumatic, normocephalic





- Eye


Eye exam: Present: normal appearance, PERRL


Pupils: Present: normal accommodation





- ENT


ENT exam: Present: normal exam, normal orophraynx, mucous membranes moist, TM's 

normal bilaterally





- Neck


Neck exam: Present: normal inspection, full ROM





- Respiratory


Respiratory exam: Present: normal lung sounds bilaterally.  Absent: respiratory 

distress, rales, rhonchi





- Cardiovascular


Cardiovascular Exam: Present: regular rate, normal rhythm.  Absent: systolic 

murmur, diastolic murmur, rubs, gallop





- GI/Abdominal


GI/Abdominal exam: Present: soft, normal bowel sounds





- Extremities Exam


Extremities exam: Present: normal inspection





- Back Exam


Back exam: Present: normal inspection





- Neurological Exam


Neurological exam: Present: alert, oriented X3





- Psychiatric


Psychiatric exam: Present: normal affect, normal mood





- Skin


Skin exam: Present: warm, dry, intact, normal color.  Absent: rash





ED Course


                                   Vital Signs











  07/20/21





  18:49


 


Temperature 98.7 F


 


Pulse Rate 86


 


Respiratory 20





Rate 


 


Blood Pressure 136/77


 


O2 Sat by Pulse 100





Oximetry 














ED Medical Decision Making





- Radiology Data


Radiology results: report reviewed





X-ray shows no acute process





- Medical Decision Making





No altered mental status, saddle respirations, belly breathing or other signs of

 impending ventilatory failure.  No intubations or recent admissions to the 

hospital for asthma.  Unlikely pneumonia, CHF, COPD, GERD


Workup


Review include a chest x-ray which was normal she also received steroids and 

albuterol 





Therapies:


Prednisone 50 mg PO.


Albuterol nebulizer





Reassessment: Patient improved with albuterol and ipratropium in less than 3 

hours.  Patient is is ambulatory with no distress no desaturate





Disposition:


Discharge home with return precautions.


Advised to follow up with primary care physician within next 24-48 hours.


Aside from this acute exacerbation patient has been well controlled on baseline 

home regimen.


Rx short steroid course, albuterol, Singulair, Flovent


Critical care attestation.: 


If time is entered above; I have spent that time in minutes in the direct care 

of this critically ill patient, excluding procedure time.








ED Disposition


Clinical Impression: 


 Asthma, Cough





Disposition: DC-01 TO HOME OR SELFCARE


Is pt being admited?: No


Does the pt Need Aspirin: No


Condition: Stable


Instructions:  Asthma (ED), Asthma, Adult, Cool Mist Vaporizer, Cough, Adult, 

Easy-to-Read


Prescriptions: 


predniSONE [Deltasone] 50 mg PO QDAY #5 tab


Montelukast [Singulair] 10 mg PO QPM #14 tablet


Benzonatate [Tessalon Perles] 100 mg PO Q8HR #30 capsule


Azithromycin [Zithromax] 500 mg PO QDAY #3 tablet


Referrals: 


PRIMARY CARE,MD [Primary Care Provider] - 3-5 Days


[facility] [Provider Group] - 3-5 Days

## 2021-07-21 ENCOUNTER — OFFICE VISIT (OUTPATIENT)
Dept: URBAN - METROPOLITAN AREA TELEHEALTH 2 | Facility: TELEHEALTH | Age: 36
End: 2021-07-21
Payer: COMMERCIAL

## 2021-07-21 VITALS — DIASTOLIC BLOOD PRESSURE: 83 MMHG | SYSTOLIC BLOOD PRESSURE: 147 MMHG

## 2021-07-21 DIAGNOSIS — K59.01 CONSTIPATION BY DELAYED COLONIC TRANSIT: ICD-10-CM

## 2021-07-21 DIAGNOSIS — R19.4 CHANGE IN BOWEL HABITS: ICD-10-CM

## 2021-07-21 DIAGNOSIS — E55.9 VITAMIN D DEFICIENCY DISEASE: ICD-10-CM

## 2021-07-21 DIAGNOSIS — K92.89 GAS BLOAT SYNDROME: ICD-10-CM

## 2021-07-21 DIAGNOSIS — Z83.79 FAMILY HISTORY OF CROHN'S DISEASE: ICD-10-CM

## 2021-07-21 DIAGNOSIS — R13.19 ESOPHAGEAL DYSPHAGIA: ICD-10-CM

## 2021-07-21 DIAGNOSIS — K21.00 GASTROESOPHAGEAL REFLUX DISEASE WITH ESOPHAGITIS WITHOUT HEMORRHAGE: ICD-10-CM

## 2021-07-21 PROBLEM — 119292006: Status: ACTIVE | Noted: 2020-07-01

## 2021-07-21 PROCEDURE — 99214 OFFICE O/P EST MOD 30 MIN: CPT | Performed by: INTERNAL MEDICINE

## 2021-07-21 RX ORDER — DEXLANSOPRAZOLE 60 MG/1
1 CAPSULE CAPSULE, DELAYED RELEASE ORAL ONCE A DAY
Qty: 90 | Refills: 3 | Status: ACTIVE | COMMUNITY
Start: 2020-07-01

## 2021-07-21 RX ORDER — FAMOTIDINE 40 MG/1
1 TABLET AT BEDTIME TABLET, FILM COATED ORAL ONCE A DAY
Qty: 90 TABLET | Refills: 3 | OUTPATIENT
Start: 2021-07-21

## 2021-07-21 RX ORDER — DILTIAZEM HYDROCHLORIDE 30 MG/1
ONE TABLET TABLET, FILM COATED ORAL DAILY
Qty: 90 | Refills: 3 | Status: ACTIVE | COMMUNITY
Start: 2020-08-10

## 2021-07-21 RX ORDER — DEXLANSOPRAZOLE 60 MG/1
1 CAPSULE CAPSULE, DELAYED RELEASE ORAL ONCE A DAY
Qty: 90 | Refills: 3
Start: 2020-07-01

## 2021-07-21 NOTE — HPI-TODAY'S VISIT:
She has a longstanding history of GERD complicated by a peptic esophageal stricture, s/p esophageal dilatation in the past in 8/2020. The pt notes that she eats well but she continues to have GERD problems. The patient has a family history of Crohn's disease with her sister being diagnosed in the past and she notes increased constipation and bowel irregularilty.  She denies melena, hematemesis or hematochezia. She does note that her constipation has worsened.

## 2021-07-26 PROBLEM — 266433003: Status: ACTIVE | Noted: 2020-07-01

## 2021-09-05 ENCOUNTER — HOSPITAL ENCOUNTER (EMERGENCY)
Dept: HOSPITAL 5 - ED | Age: 36
Discharge: HOME | End: 2021-09-05
Payer: SELF-PAY

## 2021-09-05 VITALS — SYSTOLIC BLOOD PRESSURE: 135 MMHG | DIASTOLIC BLOOD PRESSURE: 78 MMHG

## 2021-09-05 DIAGNOSIS — Z98.890: ICD-10-CM

## 2021-09-05 DIAGNOSIS — Z88.6: ICD-10-CM

## 2021-09-05 DIAGNOSIS — J01.90: ICD-10-CM

## 2021-09-05 DIAGNOSIS — F32.9: ICD-10-CM

## 2021-09-05 DIAGNOSIS — K21.9: ICD-10-CM

## 2021-09-05 DIAGNOSIS — Z79.899: ICD-10-CM

## 2021-09-05 DIAGNOSIS — Z88.8: ICD-10-CM

## 2021-09-05 DIAGNOSIS — D64.9: ICD-10-CM

## 2021-09-05 DIAGNOSIS — J45.909: Primary | ICD-10-CM

## 2021-09-05 DIAGNOSIS — F41.9: ICD-10-CM

## 2021-09-05 PROCEDURE — 99281 EMR DPT VST MAYX REQ PHY/QHP: CPT

## 2021-09-05 NOTE — EMERGENCY DEPARTMENT REPORT
ED Asthma HPI





- General


Chief Complaint: Dyspnea/Respdistress


Stated Complaint: ASTHMA,SINUS INFECTION


PUI?: No


Time Seen by Provider: 09/05/21 19:13


Source: patient


Mode of arrival: Ambulatory


Limitations: No Limitations





- History of Present Illness


Initial Comments: 





Chief complaint: Asthma sinus infection





HPI: This is a 36-year-old female history of asthma, seasonal allergies, anemia,

GERD, anxiety, depression who presents with shortness of breath nasal congestion

for 6 days.  She had a teledoc appointment.  She has finished prednisone 40 mg 

for the last 3 days.  She is in the middle of completing azithromycin Z-Kirsty.  

She thinks that missing her Advair for 2 days likely cause the worsening 

shortness of breath.  She also takes Zyrtec.  She denies fever, stomach pain.  

She has productive cough with yellow sputum.  She has nasal congestion.  She has

albuterol at home.  She had a recent COVID-19 test which was negative.


MD Complaint: "asthma attack", shortness of breath


-: Gradual, days(s) (6 days)


Severity: mild


Context: ran out of meds (Ran out of Advair, started new prescription recently.)


Associated Symptoms: productive cough





- Related Data


                                  Previous Rx's











 Medication  Instructions  Recorded  Last Taken  Type


 


LORazepam [Ativan] 0.5 mg PO BID PRN #20 tab 06/22/17 Unknown Rx


 


Ondansetron [Zofran Odt] 4 mg PO TID PRN #25 tab.rapdis 06/22/17 Unknown Rx


 


Promethazine [Phenergan TAB] 25 mg PO Q8HR PRN #25 tab 06/22/17 Unknown Rx


 


Loperamide [Imodium] 2 mg PO Q2HR #15 capsule 06/22/18 Unknown Rx


 


Mag Hydrox/Aluminum Hyd/Simeth 30 ml PO QID PRN #1 bottle 06/22/18 Unknown Rx





[Maalox Advanced Suspension]    


 


Ondansetron [Zofran Odt] 4 mg PO Q8H PRN #20 tab.rapdis 06/22/18 Unknown Rx


 


Fluconazole (Nf) [Diflucan TAB] 150 mg PO ONCE #1 tablet 09/08/18 Unknown Rx


 


HYDROcodone/APAP 5-325 [[place] 1 - 2 each PO Q6HR PRN #10 tablet 09/08/18 Unknown

 Rx





5/325]    


 


ALBUTEROL Inhaler(NF) [VENTOLIN 1 puff IH PRN #1 inha 12/04/18 Unknown Rx





Inhaler(NF)]    


 


Dextromethorphan HBr [Tussin Cough] 15 mg PO TID 7 Days #1 bottle 12/04/18 

Unknown Rx


 


Ibuprofen [Motrin 800 MG tab] 800 mg PO Q8HR PRN #20 tablet 12/04/18 Unknown Rx


 


predniSONE 10 mg PO QDAY #4 tab 12/04/18 Unknown Rx


 


Benzonatate [Tessalon Perles] 100 mg PO Q8HR PRN #20 capsule 01/08/19 Unknown Rx


 


Hydrocodone/Chlorphen P-Stirex 115 ml PO Q12HR PRN #180 cassie.er.12h 01/08/19 

Unknown Rx





[Tussionex Pennkinetic Susp]    


 


Fluconazole [Diflucan TAB] 100 mg PO BID #2 tablet 01/13/19 Unknown Rx


 


ALBUTEROL Inhaler(NF) [VENTOLIN 2 puff IH Q4HR #1 inha 02/10/19 Unknown Rx





Inhaler(NF)]    


 


Azithromycin [Zithromax Z-KIRSTY] 250 mg PO DAILY #6 tablet 02/10/19 Unknown Rx


 


Brompheniramine/Pseudoephed/Dm 10 ml PO Q4-6H PRN #200 syrup 02/10/19 Unknown Rx





[Bromfed Dm 2-30-10 mg/5 ml Syr]    


 


Diclofenac Sodium 50 mg PO BID #20 tablet.dr 02/10/19 Unknown Rx


 


predniSONE [Deltasone] 20 mg PO DAILY #15 tab 02/10/19 Unknown Rx


 


Albuterol Sulfate [Proventil Hfa] 6.7 gm IH BID #1 hfa.aer.ad 03/04/19 Unknown 

Rx


 


predniSONE [Deltasone] 20 mg PO DAILY #15 tablet 03/04/19 Unknown Rx


 


Azithromycin [Zithromax] 500 mg PO QDAY #3 tablet 07/20/21 Unknown Rx


 


Benzonatate [Tessalon Perles] 100 mg PO Q8HR #30 capsule 07/20/21 Unknown Rx


 


Montelukast [Singulair] 10 mg PO QPM #14 tablet 07/20/21 Unknown Rx


 


predniSONE [Deltasone] 50 mg PO QDAY #5 tab 07/20/21 Unknown Rx


 


Prednisone [predniSONE 10 mg 10 mg PO .TAPER #1 tab.ds.pk 09/05/21 Unknown Rx





(6-Day Pack, 21 Tabs)]    











                                    Allergies











Allergy/AdvReac Type Severity Reaction Status Date / Time


 


codeine Allergy  Hives Verified 06/20/17 18:49


 


hydroxyzine HCl Allergy  Shortness Verified 06/20/17 18:50





[From Vistaril]   of Breath  


 


hydroxyzine pamoate Allergy  Shortness Verified 06/20/17 18:50





[From Vistaril]   of Breath  


 


methocarbamol [From Robaxin] Allergy  Shortness Verified 06/20/17 18:50





   of Breath  


 


sulfamethoxazole Allergy  Hives Verified 06/20/17 18:50





[From Bactrim]     


 


trimethoprim [From Bactrim] Allergy  Hives Verified 06/20/17 18:50














ED Review of Systems


ROS: 


Stated complaint: ASTHMA,SINUS INFECTION


Other details as noted in HPI





Comment: All other systems reviewed and negative


Constitutional: denies: fever, malaise


ENT: congestion


Respiratory: cough, shortness of breath


Cardiovascular: denies: chest pain


Gastrointestinal: denies: abdominal pain, nausea, vomiting





ED Past Medical Hx





- Past Medical History


Previous Medical History?: Yes


Hx GERD: Yes


Hx Psychiatric Treatment: Yes (anxiety,depression)


Hx Asthma: Yes


Additional medical history: anemia, GERD





- Surgical History


Past Surgical History?: No





- Social History


Smoking Status: Former Smoker


Substance Use Type: Alcohol





- Medications


Home Medications: 


                                Home Medications











 Medication  Instructions  Recorded  Confirmed  Last Taken  Type


 


LORazepam [Ativan] 0.5 mg PO BID PRN #20 tab 06/22/17  Unknown Rx


 


Ondansetron [Zofran Odt] 4 mg PO TID PRN #25 tab.rapdis 06/22/17  Unknown Rx


 


Promethazine [Phenergan TAB] 25 mg PO Q8HR PRN #25 tab 06/22/17  Unknown Rx


 


Loperamide [Imodium] 2 mg PO Q2HR #15 capsule 06/22/18  Unknown Rx


 


Mag Hydrox/Aluminum Hyd/Simeth 30 ml PO QID PRN #1 bottle 06/22/18  Unknown Rx





[Maalox Advanced Suspension]     


 


Ondansetron [Zofran Odt] 4 mg PO Q8H PRN #20 tab.rapdis 06/22/18  Unknown Rx


 


Fluconazole (Nf) [Diflucan TAB] 150 mg PO ONCE #1 tablet 09/08/18  Unknown Rx


 


HYDROcodone/APAP 5-325 [[place] 1 - 2 each PO Q6HR PRN #10 tablet 09/08/18  

Unknown Rx





5/325]     


 


ALBUTEROL Inhaler(NF) [VENTOLIN 1 puff IH PRN #1 inha 12/04/18  Unknown Rx





Inhaler(NF)]     


 


Dextromethorphan HBr [Tussin Cough] 15 mg PO TID 7 Days #1 bottle 12/04/18  

Unknown Rx


 


Ibuprofen [Motrin 800 MG tab] 800 mg PO Q8HR PRN #20 tablet 12/04/18  Unknown Rx


 


predniSONE 10 mg PO QDAY #4 tab 12/04/18  Unknown Rx


 


Benzonatate [Tessalon Perles] 100 mg PO Q8HR PRN #20 capsule 01/08/19  Unknown 

Rx


 


Hydrocodone/Chlorphen P-Stirex 115 ml PO Q12HR PRN #180 cassie.er.12h 01/08/19  

Unknown Rx





[Tussionex Pennkinetic Susp]     


 


Fluconazole [Diflucan TAB] 100 mg PO BID #2 tablet 01/13/19  Unknown Rx


 


ALBUTEROL Inhaler(NF) [VENTOLIN 2 puff IH Q4HR #1 inha 02/10/19  Unknown Rx





Inhaler(NF)]     


 


Azithromycin [Zithromax Z-KIRSTY] 250 mg PO DAILY #6 tablet 02/10/19  Unknown Rx


 


Brompheniramine/Pseudoephed/Dm 10 ml PO Q4-6H PRN #200 syrup 02/10/19  Unknown 

Rx





[Bromfed Dm 2-30-10 mg/5 ml Syr]     


 


Diclofenac Sodium 50 mg PO BID #20 tablet. 02/10/19  Unknown Rx


 


predniSONE [Deltasone] 20 mg PO DAILY #15 tab 02/10/19  Unknown Rx


 


Albuterol Sulfate [Proventil Hfa] 6.7 gm IH BID #1 hfa.aer.ad 03/04/19  Unknown 

Rx


 


predniSONE [Deltasone] 20 mg PO DAILY #15 tablet 03/04/19  Unknown Rx


 


Azithromycin [Zithromax] 500 mg PO QDAY #3 tablet 07/20/21  Unknown Rx


 


Benzonatate [Tessalon Perles] 100 mg PO Q8HR #30 capsule 07/20/21  Unknown Rx


 


Montelukast [Singulair] 10 mg PO QPM #14 tablet 07/20/21  Unknown Rx


 


predniSONE [Deltasone] 50 mg PO QDAY #5 tab 07/20/21  Unknown Rx


 


Prednisone [predniSONE 10 mg 10 mg PO .TAPER #1 tab.ds.pk 09/05/21  Unknown Rx





(6-Day Pack, 21 Tabs)]     














ED Physical Exam





- General


Limitations: No Limitations


General appearance: alert, in no apparent distress





- Head


Head exam: Present: atraumatic, normocephalic





- Eye


Eye exam: Present: normal appearance





- ENT


ENT exam: Present: mucous membranes moist





- Neck


Neck exam: Present: normal inspection, full ROM





- Respiratory


Respiratory exam: Present: normal lung sounds bilaterally.  Absent: respiratory 

distress, wheezes, rales, rhonchi





- Cardiovascular


Cardiovascular Exam: Present: regular rate, normal rhythm, normal heart sounds. 

Absent: systolic murmur, diastolic murmur, rubs, gallop





- GI/Abdominal


GI/Abdominal exam: Present: soft, normal bowel sounds.  Absent: distended, 

tenderness, guarding, rebound





- Back Exam


Back exam: Present: normal inspection





- Neurological Exam


Neurological exam: Present: alert, oriented X3





- Psychiatric


Psychiatric exam: Present: normal affect, normal mood





- Skin


Skin exam: Present: warm, dry, intact, normal color.  Absent: rash





ED Course





                                   Vital Signs











  09/05/21





  19:05


 


Temperature 98.5 F


 


Pulse Rate 104 H


 


Respiratory 18





Rate 


 


Blood Pressure 135/78


 


O2 Sat by Pulse 100





Oximetry 














ED Medical Decision Making





- Medical Decision Making





History of asthma, seasonal allergies: No evidence of acute exacerbation.  

Patient has clear breath sounds.  I have prescribed prednisone taper.  

Encouraged consistent use of Advair.  Patient has received appropriate care with

prednisone burst therapy and azithromycin.


Critical care attestation.: 


If time is entered above; I have spent that time in minutes in the direct care 

of this critically ill patient, excluding procedure time.








ED Disposition


Clinical Impression: 


 Asthma, Allergic rhinitis, Acute sinusitis





Disposition: 01 HOME / SELF CARE / HOMELESS


Is pt being admited?: No


Does the pt Need Aspirin: No


Condition: Stable


Instructions:  Asthma (ED), Asthma, Adult, Sinusitis, Adult, Easy-to-Read, 

Allergic Rhinitis, Adult, Easy-to-Read


Prescriptions: 


Prednisone [predniSONE 10 mg (6-Day Pack, 21 Tabs)] 10 mg PO .TAPER #1 tab.ds.pk


Referrals: 


ROBERTA ALLEN MD [Staff Physician] - 3-5 Days

## 2021-09-28 ENCOUNTER — TELEPHONE ENCOUNTER (OUTPATIENT)
Dept: URBAN - METROPOLITAN AREA CLINIC 17 | Facility: CLINIC | Age: 36
End: 2021-09-28

## 2021-09-28 ENCOUNTER — OFFICE VISIT (OUTPATIENT)
Dept: URBAN - METROPOLITAN AREA SURGERY CENTER 16 | Facility: SURGERY CENTER | Age: 36
End: 2021-09-28
Payer: COMMERCIAL

## 2021-09-28 DIAGNOSIS — K21.00 ALKALINE REFLUX ESOPHAGITIS: ICD-10-CM

## 2021-09-28 DIAGNOSIS — K22.2 ACQUIRED ESOPHAGEAL RING: ICD-10-CM

## 2021-09-28 DIAGNOSIS — K29.60 ADENOPAPILLOMATOSIS GASTRICA: ICD-10-CM

## 2021-09-28 PROCEDURE — 43239 EGD BIOPSY SINGLE/MULTIPLE: CPT | Performed by: INTERNAL MEDICINE

## 2021-09-28 PROCEDURE — G8907 PT DOC NO EVENTS ON DISCHARG: HCPCS | Performed by: INTERNAL MEDICINE

## 2021-09-28 PROCEDURE — 43249 ESOPH EGD DILATION <30 MM: CPT | Performed by: INTERNAL MEDICINE

## 2021-10-23 NOTE — XRAY REPORT
CHEST 1 VIEW, 10/23/2021 12:53 PM 



CLINICAL INFORMATION/INDICATION: Cough



COMPARISON: Chest radiograph, 7/20/2021



FINDINGS:



SUPPORT DEVICES: None.



HEART: The cardiac silhouette is normal in size.



LUNGS/PLEURA: There is no focal airspace consolidation or significant pleural effusion.



ADDITIONAL FINDINGS: No additional acute findings.



IMPRESSION:

1. No evidence of acute cardiopulmonary process.



Signer Name: Priya Bill MD 

Signed: 10/23/2021 1:56 PM

Workstation Name: [id number]

## 2021-10-23 NOTE — EMERGENCY DEPARTMENT REPORT
HPI





- General


Chief Complaint: Upper Respiratory Infection


Time Seen by Provider: 10/23/21 12:24





- HPI


HPI: 





36-year-old -American female presents to the emergency department with a 

complaint of nausea with vomiting, diarrhea, generalized body aches, fever, 

mixed dry and productive cough, and being Covid positive.  Patient tested 

positive yesterday at a local pharmacy.  She had been having the symptoms for 

the past 2 to 3 days.  At first the patient thought that she was starting to 

have an asthma exacerbation, but then began having the nausea/vomiting/diarrhea 

and the fever.  The patient says that she has been taking Tylenol without much 

relief of her fever or body aches.  She has a past medical history of asthma.  

No recent travel or sick contacts at home.





ED Past Medical Hx





- Past Medical History


Previous Medical History?: Yes


Hx GERD: Yes


Hx Psychiatric Treatment: Yes (anxiety,depression)


Hx Asthma: Yes


Additional medical history: anemia, GERD





- Surgical History


Past Surgical History?: No





- Social History


Smoking Status: Former Smoker


Substance Use Type: Alcohol





- Medications


Home Medications: 


                                Home Medications











 Medication  Instructions  Recorded  Confirmed  Last Taken  Type


 


LORazepam [Ativan] 0.5 mg PO BID PRN #20 tab 06/22/17  Unknown Rx


 


Ondansetron [Zofran Odt] 4 mg PO TID PRN #25 tab.rapdis 06/22/17  Unknown Rx


 


Promethazine [Phenergan TAB] 25 mg PO Q8HR PRN #25 tab 06/22/17  Unknown Rx


 


Loperamide [Imodium] 2 mg PO Q2HR #15 capsule 06/22/18  Unknown Rx


 


Mag Hydrox/Aluminum Hyd/Simeth 30 ml PO QID PRN #1 bottle 06/22/18  Unknown Rx





[Maalox Advanced Suspension]     


 


Ondansetron [Zofran Odt] 4 mg PO Q8H PRN #20 tab.rapdis 06/22/18  Unknown Rx


 


Fluconazole (Nf) [Diflucan TAB] 150 mg PO ONCE #1 tablet 09/08/18  Unknown Rx


 


HYDROcodone/APAP 5-325 [[place] 1 - 2 each PO Q6HR PRN #10 tablet 09/08/18  

Unknown Rx





5/325]     


 


ALBUTEROL Inhaler(NF) [VENTOLIN 1 puff IH PRN #1 inha 12/04/18  Unknown Rx





Inhaler(NF)]     


 


Dextromethorphan HBr [Tussin Cough] 15 mg PO TID 7 Days #1 bottle 12/04/18  

Unknown Rx


 


Ibuprofen [Motrin 800 MG tab] 800 mg PO Q8HR PRN #20 tablet 12/04/18  Unknown Rx


 


predniSONE 10 mg PO QDAY #4 tab 12/04/18  Unknown Rx


 


Benzonatate [Tessalon Perles] 100 mg PO Q8HR PRN #20 capsule 01/08/19  Unknown 

Rx


 


Hydrocodone/Chlorphen P-Stirex 115 ml PO Q12HR PRN #180 cassie.er.12h 01/08/19  

Unknown Rx





[Tussionex Pennkinetic Susp]     


 


Fluconazole [Diflucan TAB] 100 mg PO BID #2 tablet 01/13/19  Unknown Rx


 


ALBUTEROL Inhaler(NF) [VENTOLIN 2 puff IH Q4HR #1 inha 02/10/19  Unknown Rx





Inhaler(NF)]     


 


Azithromycin [Zithromax Z-KIRSTY] 250 mg PO DAILY #6 tablet 02/10/19  Unknown Rx


 


Brompheniramine/Pseudoephed/Dm 10 ml PO Q4-6H PRN #200 syrup 02/10/19  Unknown 

Rx





[Bromfed Dm 2-30-10 mg/5 ml Syr]     


 


Diclofenac Sodium 50 mg PO BID #20 tablet.dr 02/10/19  Unknown Rx


 


predniSONE [Deltasone] 20 mg PO DAILY #15 tab 02/10/19  Unknown Rx


 


Albuterol Sulfate [Proventil Hfa] 6.7 gm IH BID #1 hfa.aer.ad 03/04/19  Unknown 

Rx


 


predniSONE [Deltasone] 20 mg PO DAILY #15 tablet 03/04/19  Unknown Rx


 


Azithromycin [Zithromax] 500 mg PO QDAY #3 tablet 07/20/21  Unknown Rx


 


Benzonatate [Tessalon Perles] 100 mg PO Q8HR #30 capsule 07/20/21  Unknown Rx


 


Montelukast [Singulair] 10 mg PO QPM #14 tablet 07/20/21  Unknown Rx


 


predniSONE [Deltasone] 50 mg PO QDAY #5 tab 07/20/21  Unknown Rx


 


Prednisone [predniSONE 10 mg 10 mg PO .TAPER #1 tab.ds.pk 09/05/21  Unknown Rx





(6-Day Pack, 21 Tabs)]     


 


Ondansetron [Zofran Odt] 4 mg PO Q8HR PRN #15 tab.rapdis 10/23/21  Unknown Rx














ED Review of Systems


ROS: 


Stated complaint: COVID POSITIVE/DIAREHA,COUGHING


Other details as noted in HPI





Comment: All other systems reviewed and negative


Constitutional: fever.  denies: malaise


Eyes: denies: eye pain, vision change


ENT: denies: ear pain, throat pain


Respiratory: cough, shortness of breath


Cardiovascular: denies: chest pain, edema


Gastrointestinal: nausea, vomiting, diarrhea


Genitourinary: denies: dysuria, discharge


Musculoskeletal: myalgia.  denies: joint swelling


Skin: denies: rash, lesions


Neurological: denies: weakness, numbness, paresthesias





Physical Exam





- Physical Exam


Vital Signs: 


                                   Vital Signs











  10/23/21





  12:11


 


Temperature 102.9 F H


 


Pulse Rate 115 H


 


Respiratory 16





Rate 


 


Blood Pressure 128/84


 


O2 Sat by Pulse 99





Oximetry 











Physical Exam: 





GENERAL: The patient is well-developed well-nourished.


HENT: Normocephalic.  Atraumatic.    Patient has moist mucous membranes.


EYES: Extraocular motions are intact.


NECK: Supple. Trachea is midline.


CHEST/LUNGS: There is no respiratory distress noted.


HEART/CARDIOVASCULAR: There is mild tachycardia. 


ABDOMEN: Abdomen is soft, nontender. There is no abdominal distention.


SKIN: Skin is warm and dry. 


NEURO: The patient is awake, alert, and oriented.  The patient is cooperative.  

The patient has no focal neurologic deficits.  Normal speech.


MUSCULOSKELETAL: There is no tenderness or deformity.  There is no limitation 

range of motion.





ED Course


                                   Vital Signs











  10/23/21





  12:11


 


Temperature 102.9 F H


 


Pulse Rate 115 H


 


Respiratory 16





Rate 


 


Blood Pressure 128/84


 


O2 Sat by Pulse 99





Oximetry 














ED Medical Decision Making





- Lab Data


Result diagrams: 


                                 10/23/21 12:53





                                 10/23/21 12:53





                                   Lab Results











  10/23/21 10/23/21 10/23/21 Range/Units





  12:53 12:53 Unknown 


 


WBC  6.8    (4.5-11.0)  K/mm3


 


RBC  4.33    (3.65-5.03)  M/mm3


 


Hgb  10.1    (10.1-14.3)  gm/dl


 


Hct  32.6    (30.3-42.9)  %


 


MCV  75 L    (79-97)  fl


 


MCH  23 L    (28-32)  pg


 


MCHC  31    (30-34)  %


 


RDW  15.6 H    (13.2-15.2)  %


 


Plt Count  219    (140-440)  K/mm3


 


Lymph % (Auto)  13.2 L    (13.4-35.0)  %


 


Mono % (Auto)  4.8    (0.0-7.3)  %


 


Eos % (Auto)  0.0    (0.0-4.3)  %


 


Baso % (Auto)  0.2    (0.0-1.8)  %


 


Lymph # (Auto)  0.9 L    (1.2-5.4)  K/mm3


 


Mono # (Auto)  0.3    (0.0-0.8)  K/mm3


 


Eos # (Auto)  0.0    (0.0-0.4)  K/mm3


 


Baso # (Auto)  0.0    (0.0-0.1)  K/mm3


 


Seg Neutrophils %  81.8 H    (40.0-70.0)  %


 


Seg Neutrophils #  5.6    (1.8-7.7)  K/mm3


 


Sodium   135 L   (137-145)  mmol/L


 


Potassium   3.5 L   (3.6-5.0)  mmol/L


 


Chloride   100.5   ()  mmol/L


 


Carbon Dioxide   21 L   (22-30)  mmol/L


 


Anion Gap   17   mmol/L


 


BUN   7   (7-17)  mg/dL


 


Creatinine   0.8   (0.6-1.2)  mg/dL


 


Estimated GFR   > 60   ml/min


 


BUN/Creatinine Ratio   9   %


 


Glucose   93   ()  mg/dL


 


Calcium   7.9 L   (8.4-10.2)  mg/dL


 


Total Bilirubin   0.40   (0.1-1.2)  mg/dL


 


AST   68 H   (5-40)  units/L


 


ALT   69 H   (7-56)  units/L


 


Alkaline Phosphatase   53   ()  units/L


 


Total Protein   7.0   (6.3-8.2)  g/dL


 


Albumin   3.5 L   (3.9-5)  g/dL


 


Albumin/Globulin Ratio   1.0   %


 


Urine HCG, Qual    Negative  (Negative)  














- Radiology Data


Radiology results: image reviewed


interpreted by me: 





Chest x-ray does not show any acute process.  There are no pleural effusions, 

obvious pneumonia and there is no pneumothorax.  No widened mediastinum.





- Medical Decision Making





This patient presents to the emergency department with complaint of nausea and 

vomiting, occasional cough, fever, and being positive for COVID-19.  On exam

ination the patient has some mild tachycardia, but does not appear in any 

respiratory or acute distress.  Chest x-ray does not show any pneumonia, pleural

effusions, pneumothorax, widened mediastinum, or any other acute process.  Labs 

have been mostly unremarkable except for some mild transaminitis.  The patient 

was given some IV fluid resuscitation, antipyretic and antiemetics.  Upon 

reevaluation she is feeling somewhat improved and able to pass an oral 

challenge.  We discussed the transaminitis and she understands to avoid alcohol 

or any significant Tylenol/acetaminophen use.  The patient does not have any 

oxygen desaturation or increased work of breathing and does not appear to 

require a medical admission at this time.  We discussed isolation/quarantine.  

She will be given a prescription for Zofran ODT.


Critical Care Time: No


Critical care attestation.: 


If time is entered above; I have spent that time in minutes in the direct care o

f this critically ill patient, excluding procedure time.








ED Disposition


Clinical Impression: 


 COVID-19, Body aches, Elevated liver enzymes





Nausea & vomiting


Qualifiers:


 Vomiting type: unspecified Vomiting Intractability: non-intractable Qualified 

Code(s): R11.2 - Nausea with vomiting, unspecified





Fever


Qualifiers:


 Fever type: unspecified Qualified Code(s): R50.9 - Fever, unspecified





Disposition: 01 HOME / SELF CARE / HOMELESS


Is pt being admited?: No


Condition: Stable


Instructions:  COVID-19 Frequently Asked Questions, Fever, Adult, COVID-19, 

Nausea and Vomiting, Adult


Additional Instructions: 


You were found to have elevated liver enzymes on your laboratory studies today. 

Because of this, please avoid any alcohol use or any significant Tylen

ol/acetaminophen use.





Please isolate/quarantine yourself from anybody who is elderly, immunocomprom

ised, chronically ill debilitated, and especially those that are not vaccinated 

against COVID-19.





You can take ibuprofen every 6-8 hours, using dosing on the back of the bottle, 

as needed for fever or body aches.  Take the ibuprofen with food.  Make sure you

drink plenty of water.





Return to the emergency department with any worsening of your symptoms, new or 

concerning symptoms not addressed during this current emergency department 

visit, or with any acute distress.


Prescriptions: 


Ondansetron [Zofran Odt] 4 mg PO Q8HR PRN #15 tab.rapdis


 PRN Reason: Nausea


Referrals: 


ROBERTA ALLEN MD [Staff Physician] - 3-5 Days


[facility] [Provider Group] - 3-5 Days


Time of Disposition: 15:16

## 2021-10-26 NOTE — XRAY REPORT
CHEST 2 VIEWS 



INDICATION / CLINICAL INFORMATION: cough.



COMPARISON: 10/23/2021



FINDINGS:



SUPPORT DEVICES: None.

HEART / MEDIASTINUM: Unchanged 

LUNGS / PLEURA: There are bilateral airspace opacities in the mid lower lung zones characteristic of 
pneumonia. No pneumothorax. 



ADDITIONAL FINDINGS: No significant additional findings.



IMPRESSION:

1. There are bilateral patchy airspace opacities characteristic of pneumonia.



Signer Name: Lonny De Luna MD 

Signed: 10/26/2021 8:28 AM

Workstation Name: [id number]

## 2021-10-26 NOTE — CAT SCAN REPORT
CT chest with contrast



INDICATION : Chest pain and shortness of breath.



TECHNIQUE:  100 mL of intravenous contrast administered.  All CT scans at this location are performed
 using CT dose reduction for ALARA by means of automated exposure control. 



COMPARISON:  10/26/2021.



FINDINGS:



No filling defect to suggest presence of pulmonary embolism.



Multifocal areas of patchy lower lobe predominant consolidation in a pattern most consistent with inf
ection. No pleural effusion or pneumothorax.



Heart is within normal limits in terms of size. No evidence of pericardial effusion. No mediastinal o
r axillary lymphadenopathy.



Osseous structures show no evidence acute fracture or aggressive osseous destructive lesion.



Limited evaluation of the upper abdomen is unremarkable.



IMPRESSION:



Multifocal patchy lower lobe predominant consolidation consistent with infection.



No evidence of pulmonary embolism.

 



Signer Name: Swapnil Joyner MD 

Signed: 10/26/2021 10:54 AM

Workstation Name: [id number]

## 2021-10-26 NOTE — EMERGENCY DEPARTMENT REPORT
- General


Chief Complaint: Upper Respiratory Infection


Stated Complaint: COVID+ CHEST PRESSURE COUGH FEVER NAUSEA


Time Seen by Provider: 10/26/21 07:58


Source: patient


Mode of arrival: Ambulatory


Limitations: No Limitations





- History of Present Illness


Initial Comments: 





This is a 36-year-old female nontoxic, well nourished in appearance, no acute 

signs of distress presents to the ED with c/o of productive cough, subjective 

fever, chills, body aches, chest tightness, shortness of breath, rhinorrhea, 

nasal congestion x3 days.  Patient stated symptoms started after being diagnosed

with Covid.  Patient describes productive cough as yellow mucus production.  

Patient denies sick contacts.  Patient denies being Covid vaccinated.  Patient 

denies any recent travels, long car, recent hospital stays.  Patient denies any 

calf pain or calf tenderness.  Patient denies any other symptoms or complaints. 

Patient denies any chest pain, nausea, vomiting, hemoptysis, numbness, tingling,

headache or stiff neck.  Patient denies any significant past medical history.


MD Complaint: cough, rhinorrhea, nasal congestion


-: days(s)


Severity: mild


Severity scale (0 -10): 3


Quality: aching


Consistency: constant


Improves With: nothing


Worsens With: nothing


Associated Symptoms: fever, chills, rhinorrhea, nasal congestion, cough, 

shortness of breath.  denies: myalgias, diaphoresis, headache, sore throat, 

stiff neck, chest pain, abdominal pain, nausea, vomiting, diarrhea, dysuria, 

rash, confusion, right sweats, weight loss, epistaxis, hoarseness, ear pain


Treatments Prior to Arrival: none





- Related Data


                                  Previous Rx's











 Medication  Instructions  Recorded  Last Taken  Type


 


LORazepam [Ativan] 0.5 mg PO BID PRN #20 tab 17 Unknown Rx


 


Ondansetron [Zofran Odt] 4 mg PO TID PRN #25 tab.rapdis 17 Unknown Rx


 


Promethazine [Phenergan TAB] 25 mg PO Q8HR PRN #25 tab 17 Unknown Rx


 


Loperamide [Imodium] 2 mg PO Q2HR #15 capsule 18 Unknown Rx


 


Mag Hydrox/Aluminum Hyd/Simeth 30 ml PO QID PRN #1 bottle 18 Unknown Rx





[Maalox Advanced Suspension]    


 


Ondansetron [Zofran Odt] 4 mg PO Q8H PRN #20 tab.rapdis 18 Unknown Rx


 


Fluconazole (Nf) [Diflucan TAB] 150 mg PO ONCE #1 tablet 18 Unknown Rx


 


HYDROcodone/APAP 5-325 [Floydada 1 - 2 each PO Q6HR PRN #10 tablet 18 Unknown

 Rx





5/325]    


 


ALBUTEROL Inhaler(NF) [VENTOLIN 1 puff IH PRN #1 inha 18 Unknown Rx





Inhaler(NF)]    


 


Dextromethorphan HBr [Tussin Cough] 15 mg PO TID 7 Days #1 bottle 18 

Unknown Rx


 


Ibuprofen [Motrin 800 MG tab] 800 mg PO Q8HR PRN #20 tablet 18 Unknown Rx


 


predniSONE 10 mg PO QDAY #4 tab 18 Unknown Rx


 


Benzonatate [Tessalon Perles] 100 mg PO Q8HR PRN #20 capsule 19 Unknown Rx


 


Hydrocodone/Chlorphen P-Stirex 115 ml PO Q12HR PRN #180 cassie.er.12h 19 

Unknown Rx





[Tussionex Pennkinetic Susp]    


 


Fluconazole [Diflucan TAB] 100 mg PO BID #2 tablet 19 Unknown Rx


 


ALBUTEROL Inhaler(NF) [VENTOLIN 2 puff IH Q4HR #1 inha 02/10/19 Unknown Rx





Inhaler(NF)]    


 


Azithromycin [Zithromax Z-KIRSTY] 250 mg PO DAILY #6 tablet 02/10/19 Unknown Rx


 


Brompheniramine/Pseudoephed/Dm 10 ml PO Q4-6H PRN #200 syrup 02/10/19 Unknown Rx





[Bromfed Dm 2-30-10 mg/5 ml Syr]    


 


Diclofenac Sodium 50 mg PO BID #20 tablet.dr 02/10/19 Unknown Rx


 


predniSONE [Deltasone] 20 mg PO DAILY #15 tab 02/10/19 Unknown Rx


 


Albuterol Sulfate [Proventil Hfa] 6.7 gm IH BID #1 hfa.aer.ad 19 Unknown 

Rx


 


predniSONE [Deltasone] 20 mg PO DAILY #15 tablet 19 Unknown Rx


 


Azithromycin [Zithromax] 500 mg PO QDAY #3 tablet 21 Unknown Rx


 


Benzonatate [Tessalon Perles] 100 mg PO Q8HR #30 capsule 21 Unknown Rx


 


Montelukast [Singulair] 10 mg PO QPM #14 tablet 21 Unknown Rx


 


predniSONE [Deltasone] 50 mg PO QDAY #5 tab 21 Unknown Rx


 


Prednisone [predniSONE 10 mg 10 mg PO .TAPER #1 tab.ds.pk 21 Unknown Rx





(6-Day Pack, 21 Tabs)]    


 


Ondansetron [Zofran Odt] 4 mg PO Q8HR PRN #15 tab.rapdis 10/23/21 Unknown Rx


 


Azithromycin [Zithromax Z-KIRSTY] 250 mg PO DAILY #6 tablet 10/26/21 Unknown Rx


 


Benzonatate [Tessalon Perles] 100 mg PO Q8HR PRN #12 capsule 10/26/21 Unknown Rx











                                    Allergies











Allergy/AdvReac Type Severity Reaction Status Date / Time


 


hydroxyzine HCl Allergy  Shortness Verified 17 18:50





[From Vistaril]   of Breath  


 


hydroxyzine pamoate Allergy  Shortness Verified 17 18:50





[From Vistaril]   of Breath  


 


methocarbamol [From Robaxin] Allergy  Shortness Verified 17 18:50





   of Breath  


 


sulfamethoxazole Allergy  Hives Verified 17 18:50





[From Bactrim]     


 


trimethoprim [From Bactrim] Allergy  Hives Verified 17 18:50














ED Review of Systems


ROS: 


Stated complaint: COVID+ CHEST PRESSURE COUGH FEVER NAUSEA


Other details as noted in HPI





Comment: All other systems reviewed and negative


Constitutional: chills, fever


Eyes: denies: eye pain, eye discharge, vision change


ENT: congestion.  denies: ear pain, throat pain


Respiratory: cough, shortness of breath.  denies: orthopnea, SOB with exertion, 

SOB at rest, wheezing


Cardiovascular: denies: chest pain, palpitations, dyspnea on exertion, 

orthopnea, edema, syncope, paroxysmal nocturnal dyspnea


Endocrine: no symptoms reported


Gastrointestinal: denies: abdominal pain, nausea, diarrhea


Genitourinary: denies: urgency, dysuria, discharge


Musculoskeletal: denies: back pain, joint swelling, arthralgia


Skin: denies: rash, lesions


Neurological: denies: headache, weakness, paresthesias


Psychiatric: denies: anxiety, depression


Hematological/Lymphatic: denies: easy bleeding, easy bruising





ED Past Medical Hx





- Past Medical History


Hx GERD: Yes


Hx Psychiatric Treatment: Yes (anxiety,depression)


Hx Asthma: Yes


Additional medical history: anemia, GERD





- Surgical History


Past Surgical History?: No





- Social History


Smoking Status: Never Smoker


Substance Use Type: None





- Medications


Home Medications: 


                                Home Medications











 Medication  Instructions  Recorded  Confirmed  Last Taken  Type


 


LORazepam [Ativan] 0.5 mg PO BID PRN #20 tab 17  Unknown Rx


 


Ondansetron [Zofran Odt] 4 mg PO TID PRN #25 tab.rapdis 17  Unknown Rx


 


Promethazine [Phenergan TAB] 25 mg PO Q8HR PRN #25 tab 17  Unknown Rx


 


Loperamide [Imodium] 2 mg PO Q2HR #15 capsule 18  Unknown Rx


 


Mag Hydrox/Aluminum Hyd/Simeth 30 ml PO QID PRN #1 bottle 18  Unknown Rx





[Maalox Advanced Suspension]     


 


Ondansetron [Zofran Odt] 4 mg PO Q8H PRN #20 tab.rapdis 18  Unknown Rx


 


Fluconazole (Nf) [Diflucan TAB] 150 mg PO ONCE #1 tablet 18  Unknown Rx


 


HYDROcodone/APAP 5-325 [Floydada 1 - 2 each PO Q6HR PRN #10 tablet 18  

Unknown Rx





5/325]     


 


ALBUTEROL Inhaler(NF) [VENTOLIN 1 puff IH PRN #1 inha 18  Unknown Rx





Inhaler(NF)]     


 


Dextromethorphan HBr [Tussin Cough] 15 mg PO TID 7 Days #1 bottle 18  

Unknown Rx


 


Ibuprofen [Motrin 800 MG tab] 800 mg PO Q8HR PRN #20 tablet 18  Unknown Rx


 


predniSONE 10 mg PO QDAY #4 tab 18  Unknown Rx


 


Benzonatate [Tessalon Perles] 100 mg PO Q8HR PRN #20 capsule 19  Unknown 

Rx


 


Hydrocodone/Chlorphen P-Stirex 115 ml PO Q12HR PRN #180 cassie.er.12h 19  

Unknown Rx





[Tussionex Pennkinetic Susp]     


 


Fluconazole [Diflucan TAB] 100 mg PO BID #2 tablet 19  Unknown Rx


 


ALBUTEROL Inhaler(NF) [VENTOLIN 2 puff IH Q4HR #1 inha 02/10/19  Unknown Rx





Inhaler(NF)]     


 


Azithromycin [Zithromax Z-KIRSTY] 250 mg PO DAILY #6 tablet 02/10/19  Unknown Rx


 


Brompheniramine/Pseudoephed/Dm 10 ml PO Q4-6H PRN #200 syrup 02/10/19  Unknown 

Rx





[Bromfed Dm 2-30-10 mg/5 ml Syr]     


 


Diclofenac Sodium 50 mg PO BID #20 tablet.dr 02/10/19  Unknown Rx


 


predniSONE [Deltasone] 20 mg PO DAILY #15 tab 02/10/19  Unknown Rx


 


Albuterol Sulfate [Proventil Hfa] 6.7 gm IH BID #1 hfa.aer.ad 19  Unknown 

Rx


 


predniSONE [Deltasone] 20 mg PO DAILY #15 tablet 19  Unknown Rx


 


Azithromycin [Zithromax] 500 mg PO QDAY #3 tablet 21  Unknown Rx


 


Benzonatate [Tessalon Perles] 100 mg PO Q8HR #30 capsule 21  Unknown Rx


 


Montelukast [Singulair] 10 mg PO QPM #14 tablet 21  Unknown Rx


 


predniSONE [Deltasone] 50 mg PO QDAY #5 tab 21  Unknown Rx


 


Prednisone [predniSONE 10 mg 10 mg PO .TAPER #1 tab.ds.pk 21  Unknown Rx





(6-Day Pack, 21 Tabs)]     


 


Ondansetron [Zofran Odt] 4 mg PO Q8HR PRN #15 tab.rapdis 10/23/21  Unknown Rx


 


Azithromycin [Zithromax Z-KIRSTY] 250 mg PO DAILY #6 tablet 10/26/21  Unknown Rx


 


Benzonatate [Tessalon Perles] 100 mg PO Q8HR PRN #12 capsule 10/26/21  Unknown 

Rx














ED Physical Exam





- General


Limitations: No Limitations


General appearance: alert, in no apparent distress





- Head


Head exam: Present: atraumatic, normocephalic





- Eye


Eye exam: Present: normal appearance





- ENT


ENT exam: Present: normal exam, normal orophraynx





- Neck


Neck exam: Present: normal inspection, full ROM.  Absent: lymphadenopathy





- Respiratory


Respiratory exam: Present: normal lung sounds bilaterally.  Absent: respiratory 

distress, wheezes, rales, rhonchi, stridor, chest wall tenderness, accessory 

muscle use, decreased breath sounds, prolonged expiratory





- Cardiovascular


Cardiovascular Exam: Present: regular rate, normal rhythm, normal heart sounds. 

Absent: bradycardia, tachycardia, irregular rhythm, systolic murmur, diastolic 

murmur, rubs, gallop





- GI/Abdominal


GI/Abdominal exam: Present: soft, normal bowel sounds.  Absent: distended, 

tenderness, guarding, rebound, rigid, diminished bowel sounds





- Extremities Exam


Extremities exam: Present: normal inspection, full ROM, normal capillary refill.

 Absent: tenderness





- Back Exam


Back exam: Present: normal inspection, full ROM.  Absent: tenderness, CVA 

tenderness (R), CVA tenderness (L), muscle spasm, paraspinal tenderness, 

vertebral tenderness, rash noted





- Neurological Exam


Neurological exam: Present: alert, oriented X3, normal gait





- Psychiatric


Psychiatric exam: Present: normal affect, normal mood





- Skin


Skin exam: Present: warm, dry, intact, normal color.  Absent: rash





ED Course


                                   Vital Signs











  10/26/21 10/26/21 10/26/21





  06:27 09:18 09:31


 


Temperature 98.5 F  


 


Pulse Rate 83 84 80


 


Respiratory 28 H 19 28 H





Rate   


 


Blood Pressure 126/81  120/78


 


O2 Sat by Pulse 100 100 100





Oximetry   














  10/26/21 10/26/21 10/26/21





  09:38 09:45 09:49


 


Temperature   


 


Pulse Rate  78 


 


Respiratory 18 25 H 18





Rate   


 


Blood Pressure  121/81 


 


O2 Sat by Pulse  99 98





Oximetry   














- Reevaluation(s)


Reevaluation #1: 





10/26/21 11:56


Patient is speaking in full sentences with no signs of distress noted.





ED Medical Decision Making





- Lab Data


Result diagrams: 


                                 10/26/21 08:25





                                 10/26/21 08:25








                                   Lab Results











  10/26/21 10/26/21 10/26/21 Range/Units





  08:25 08:25 08:25 


 


WBC  3.4 L    (4.5-11.0)  K/mm3


 


RBC  4.15    (3.65-5.03)  M/mm3


 


Hgb  9.6 L    (10.1-14.3)  gm/dl


 


Hct  30.7    (30.3-42.9)  %


 


MCV  74 L    (79-97)  fl


 


MCH  23 L    (28-32)  pg


 


MCHC  31    (30-34)  %


 


RDW  15.9 H    (13.2-15.2)  %


 


Plt Count  225    (140-440)  K/mm3


 


Lymph % (Auto)  26.7    (13.4-35.0)  %


 


Mono % (Auto)  7.1    (0.0-7.3)  %


 


Eos % (Auto)  3.5    (0.0-4.3)  %


 


Baso % (Auto)  0.2    (0.0-1.8)  %


 


Lymph # (Auto)  0.9 L    (1.2-5.4)  K/mm3


 


Mono # (Auto)  0.2    (0.0-0.8)  K/mm3


 


Eos # (Auto)  0.1    (0.0-0.4)  K/mm3


 


Baso # (Auto)  0.0    (0.0-0.1)  K/mm3


 


Seg Neutrophils %  62.5    (40.0-70.0)  %


 


Seg Neutrophils #  2.1    (1.8-7.7)  K/mm3


 


PT   13.0   (12.2-14.9)  Sec.


 


INR   0.88   (0.87-1.13)  


 


APTT   30.4   (24.2-36.6)  Sec.


 


Sodium    136 L  (137-145)  mmol/L


 


Potassium    3.2 L  (3.6-5.0)  mmol/L


 


Chloride    99.7  ()  mmol/L


 


Carbon Dioxide    21 L  (22-30)  mmol/L


 


Anion Gap    19  mmol/L


 


BUN    6 L  (7-17)  mg/dL


 


Creatinine    0.6  (0.6-1.2)  mg/dL


 


Estimated GFR    > 60  ml/min


 


BUN/Creatinine Ratio    10  %


 


Glucose    79  ()  mg/dL


 


Calcium    7.9 L  (8.4-10.2)  mg/dL


 


Total Bilirubin    0.60  (0.1-1.2)  mg/dL


 


AST    31  (5-40)  units/L


 


ALT    47  (7-56)  units/L


 


Alkaline Phosphatase    62  ()  units/L


 


Troponin T    < 0.010  (0.00-0.029)  ng/mL


 


Total Protein    7.0  (6.3-8.2)  g/dL


 


Albumin    3.5 L  (3.9-5)  g/dL


 


Albumin/Globulin Ratio    1.0  %


 


HCG, Qual     (Negative)  














  10/26/21 10/26/21 Range/Units





  08:25 11:42 


 


WBC    (4.5-11.0)  K/mm3


 


RBC    (3.65-5.03)  M/mm3


 


Hgb    (10.1-14.3)  gm/dl


 


Hct    (30.3-42.9)  %


 


MCV    (79-97)  fl


 


MCH    (28-32)  pg


 


MCHC    (30-34)  %


 


RDW    (13.2-15.2)  %


 


Plt Count    (140-440)  K/mm3


 


Lymph % (Auto)    (13.4-35.0)  %


 


Mono % (Auto)    (0.0-7.3)  %


 


Eos % (Auto)    (0.0-4.3)  %


 


Baso % (Auto)    (0.0-1.8)  %


 


Lymph # (Auto)    (1.2-5.4)  K/mm3


 


Mono # (Auto)    (0.0-0.8)  K/mm3


 


Eos # (Auto)    (0.0-0.4)  K/mm3


 


Baso # (Auto)    (0.0-0.1)  K/mm3


 


Seg Neutrophils %    (40.0-70.0)  %


 


Seg Neutrophils #    (1.8-7.7)  K/mm3


 


PT    (12.2-14.9)  Sec.


 


INR    (0.87-1.13)  


 


APTT    (24.2-36.6)  Sec.


 


Sodium    (137-145)  mmol/L


 


Potassium    (3.6-5.0)  mmol/L


 


Chloride    ()  mmol/L


 


Carbon Dioxide    (22-30)  mmol/L


 


Anion Gap    mmol/L


 


BUN    (7-17)  mg/dL


 


Creatinine    (0.6-1.2)  mg/dL


 


Estimated GFR    ml/min


 


BUN/Creatinine Ratio    %


 


Glucose    ()  mg/dL


 


Calcium    (8.4-10.2)  mg/dL


 


Total Bilirubin    (0.1-1.2)  mg/dL


 


AST    (5-40)  units/L


 


ALT    (7-56)  units/L


 


Alkaline Phosphatase    ()  units/L


 


Troponin T   < 0.010  (0.00-0.029)  ng/mL


 


Total Protein    (6.3-8.2)  g/dL


 


Albumin    (3.9-5)  g/dL


 


Albumin/Globulin Ratio    %


 


HCG, Qual  Negative   (Negative)  














- Radiology Data





[facility] [address] [place] [address] Cat

Scan Report Signed Patient: OSKAR GUARDADO MR# : [id number] : 

1985 Acct:[id number] Age/Sex: 36 / F ADM Date: 10/26/21 Loc: ED 

Attending Dr: Ordering Physician: MARKOS ROBERTSON NP Date of Service: 10/26/21 

Procedure(s): CT angio chest Accession Number(s): [id number] cc: MARKOS ROBERTSON NP 

CT chest with contrast INDICATION : Chest pain and shortness of breath. 

TECHNIQUE: 100 mL of intravenous contrast administered. All CT scans at this 

location are performed using CT dose reduction for ALARA by means of automated 

exposure control. COMPARISON: 10/26/2021. FINDINGS: No filling defect to suggest

presence of pulmonary embolism. Multifocal areas of patchy lower lobe 

predominant consolidation in a pattern most consistent with infection. No 

pleural effusion or pneumothorax. Heart is within normal limits in terms of 

size. No evidence of pericardial effusion. No mediastinal or axillary 

lymphadenopathy. Osseous structures show no evidence acute fracture or 

aggressive osseous destructive lesion. Limited evaluation of the upper abdomen 

is unremarkable. IMPRESSION: Multifocal patchy lower lobe predominant 

consolidation consistent with infection. No evidence of pulmonary embolism. 

Signer Name: Swapnil Sexton MD Signed: 10/26/2021 10:54 AM Workstation Name: 

[id number] Transcribed By: MA Dictated By: SWAPNIL SEXTON MD Electronically 

Authenticated By: SWAPNIL SEXTON MD Signed Date/Time: 10/26/21 1054 DD/DT: 

10/26/21 1048 TD/TT:





- Medical Decision Making





This is a 36-year-old female that presents with COVID with PNA.  Patient is 

stable and was examined by me.  Chest imaging has been obtained and dictated by 

radiologist with normal exam.  Patient is notified of x-ray results with no 

questions noted.  Labs obtained.  Troponin negative.  EKG obtained.  SUSANNA and 

HEART score 0 pints. Patient was instructed to increase hydration, rest and take

Tylenol for fever episodes.  Patient received Rocephin and azithromycin IV in 

the ED.  Vitals stable prior to discharge.  Patient was not hypoxic during 

physical exam and patient ambulatory without hypoxemia.  Patient is nonfebrile a

nd normal heart rate. Patient was instructed Follow-up with a primary care 

doctor in 3-5 days or if symptoms worsen and continue return to emergency room 

as soon as possible.  At time time of discharge, the patient does not seem toxic

or ill in appearance.  No acute signs of distress noted.  Patient agrees to 

discharge treatment plan of care.  No further questions noted by the patient.nt.


Critical care attestation.: 


If time is entered above; I have spent that time in minutes in the direct care 

of this critically ill patient, excluding procedure time.








ED Disposition


Clinical Impression: 


 COVID-19





PNA (pneumonia)


Qualifiers:


 Pneumonia type: due to unspecified organism Laterality: bilateral Lung 

location: unspecified part of lung Qualified Code(s): J18.9 - Pneumonia, 

unspecified organism





Disposition:  HOME / SELF CARE / HOMELESS


Is pt being admited?: No


Does the pt Need Aspirin: No


Condition: Stable


Instructions:  Bacterial Pneumonia (ED), COVID-19, COVID-19 Frequently Asked 

Questions, Community-Acquired Pneumonia, Adult


Additional Instructions: 


Follow-up with a primary care doctor in 3-5 days or if symptoms worsen and 

continue return to emergency room as soon as possible. 





Increased rest, hydration, and take Tylenol as prescribed for fever episode.


Prescriptions: 


Benzonatate [Tessalon Perles] 100 mg PO Q8HR PRN #12 capsule


 PRN Reason: Cough


Azithromycin [Zithromax Z-KIRSTY] 250 mg PO DAILY #6 tablet


Referrals: 


PRIMARY CARE,MD [Primary Care Provider] - 3-5 Days


ROBERTA ALLEN MD [Staff Physician] - 3-5 Days


Time of Disposition: 12:49

## 2021-10-27 NOTE — ELECTROCARDIOGRAPH REPORT
[facility]

                                       

Test Date:    2021-10-26               Test Time:    09:01:28

Pat Name:     OSKAR GUARDADO         Department:   

Patient ID:   [id number]          Room:          

Gender:       F                        Technician:   david

:          1985               Requested By: MARKOS ROBERTSON

Order Number: [id number]              Reading MD:   Cam Landeros

                                 Measurements

Intervals                              Axis          

Rate:         76                       P:            18

OR:           114                      QRS:          48

QRSD:         83                       T:            12

QT:           419                                    

QTc:          472                                    

                           Interpretive Statements

Sinus rhythm

No previous ECG available for comparison

Electronically Signed On 10- 17:34:35 EDT by Cam Landeros

## 2022-01-17 PROBLEM — 35298007: Status: ACTIVE | Noted: 2021-07-21

## 2022-02-01 ENCOUNTER — OFFICE VISIT (OUTPATIENT)
Dept: URBAN - METROPOLITAN AREA SURGERY CENTER 16 | Facility: SURGERY CENTER | Age: 37
End: 2022-02-01
Payer: COMMERCIAL

## 2022-02-01 DIAGNOSIS — R19.4 ALTERATION IN BOWEL ELIMINATION: ICD-10-CM

## 2022-02-01 DIAGNOSIS — K63.89 BACTERIAL OVERGROWTH SYNDROME: ICD-10-CM

## 2022-02-01 PROCEDURE — G8907 PT DOC NO EVENTS ON DISCHARG: HCPCS | Performed by: INTERNAL MEDICINE

## 2022-02-01 PROCEDURE — 45380 COLONOSCOPY AND BIOPSY: CPT | Performed by: INTERNAL MEDICINE

## 2022-02-01 RX ORDER — DEXLANSOPRAZOLE 60 MG/1
1 CAPSULE CAPSULE, DELAYED RELEASE ORAL ONCE A DAY
Qty: 90 | Refills: 3 | Status: ACTIVE | COMMUNITY
Start: 2020-07-01

## 2022-02-01 RX ORDER — FAMOTIDINE 40 MG/1
1 TABLET AT BEDTIME TABLET, FILM COATED ORAL ONCE A DAY
Qty: 90 TABLET | Refills: 3 | Status: ACTIVE | COMMUNITY
Start: 2021-07-21

## 2022-02-01 RX ORDER — DILTIAZEM HYDROCHLORIDE 30 MG/1
ONE TABLET TABLET, FILM COATED ORAL DAILY
Qty: 90 | Refills: 3 | Status: ACTIVE | COMMUNITY
Start: 2020-08-10

## 2022-03-02 ENCOUNTER — OFFICE VISIT (OUTPATIENT)
Dept: URBAN - METROPOLITAN AREA TELEHEALTH 2 | Facility: TELEHEALTH | Age: 37
End: 2022-03-02
Payer: COMMERCIAL

## 2022-03-02 DIAGNOSIS — R13.19 ESOPHAGEAL DYSPHAGIA: ICD-10-CM

## 2022-03-02 DIAGNOSIS — K22.2 ESOPHAGEAL STRICTURE: ICD-10-CM

## 2022-03-02 DIAGNOSIS — R19.4 CHANGE IN BOWEL HABITS: ICD-10-CM

## 2022-03-02 DIAGNOSIS — K63.89 MELANOSIS COLI: ICD-10-CM

## 2022-03-02 PROCEDURE — 99214 OFFICE O/P EST MOD 30 MIN: CPT | Performed by: INTERNAL MEDICINE

## 2022-03-02 RX ORDER — FAMOTIDINE 40 MG/1
1 TABLET TABLET, FILM COATED ORAL BID
Qty: 180 TABLET | Refills: 3
Start: 2021-07-21

## 2022-03-02 RX ORDER — DEXLANSOPRAZOLE 60 MG/1
1 CAPSULE CAPSULE, DELAYED RELEASE ORAL ONCE A DAY
Qty: 90 | Refills: 3 | Status: ACTIVE | COMMUNITY
Start: 2020-07-01

## 2022-03-02 RX ORDER — DILTIAZEM HYDROCHLORIDE 30 MG/1
ONE TABLET TABLET, FILM COATED ORAL DAILY
Qty: 90 | Refills: 3 | Status: ACTIVE | COMMUNITY
Start: 2020-08-10

## 2022-03-02 RX ORDER — FAMOTIDINE 40 MG/1
1 TABLET AT BEDTIME TABLET, FILM COATED ORAL ONCE A DAY
Qty: 90 TABLET | Refills: 3 | Status: ACTIVE | COMMUNITY
Start: 2021-07-21

## 2022-03-02 NOTE — HPI-TODAY'S VISIT:
The pt with a longstanding history of GERD, peptic esophageal stricture, s/p esophageal balloon dilatation in the past who presents for a f/u ov. The pt notes that she has been having difficulty swallowing over the last 3 months. She is s/p EGD with dil in 9/2021 and she notes that her symptoms improved initially but she has struggled of recent. She is taking Dexilant 60mg in am and Pepcid 40mg at bedtime.

## 2022-03-08 PROBLEM — 63305008: Status: ACTIVE | Noted: 2022-03-02

## 2022-03-15 ENCOUNTER — OFFICE VISIT (OUTPATIENT)
Dept: URBAN - METROPOLITAN AREA SURGERY CENTER 16 | Facility: SURGERY CENTER | Age: 37
End: 2022-03-15
Payer: COMMERCIAL

## 2022-03-15 ENCOUNTER — TELEPHONE ENCOUNTER (OUTPATIENT)
Dept: URBAN - METROPOLITAN AREA CLINIC 92 | Facility: CLINIC | Age: 37
End: 2022-03-15

## 2022-03-15 ENCOUNTER — WEB ENCOUNTER (OUTPATIENT)
Dept: URBAN - METROPOLITAN AREA SURGERY CENTER 16 | Facility: SURGERY CENTER | Age: 37
End: 2022-03-15

## 2022-03-15 DIAGNOSIS — K22.2 ACQUIRED ESOPHAGEAL RING: ICD-10-CM

## 2022-03-15 DIAGNOSIS — K29.60 ADENOPAPILLOMATOSIS GASTRICA: ICD-10-CM

## 2022-03-15 PROBLEM — 235616003: Status: ACTIVE | Noted: 2022-03-15

## 2022-03-15 PROCEDURE — 43239 EGD BIOPSY SINGLE/MULTIPLE: CPT | Performed by: INTERNAL MEDICINE

## 2022-03-15 PROCEDURE — G8907 PT DOC NO EVENTS ON DISCHARG: HCPCS | Performed by: INTERNAL MEDICINE

## 2022-03-15 RX ORDER — FAMOTIDINE 40 MG/1
1 TABLET TABLET, FILM COATED ORAL BID
Qty: 180 TABLET | Refills: 3
Start: 2021-07-21

## 2022-03-15 RX ORDER — DILTIAZEM HYDROCHLORIDE 30 MG/1
ONE TABLET TABLET, FILM COATED ORAL DAILY
Qty: 90 | Refills: 3 | Status: ACTIVE | COMMUNITY
Start: 2020-08-10

## 2022-03-15 RX ORDER — DEXLANSOPRAZOLE 60 MG/1
1 CAPSULE CAPSULE, DELAYED RELEASE ORAL ONCE A DAY
Qty: 90 | Refills: 3
Start: 2020-07-01

## 2022-03-15 RX ORDER — DEXLANSOPRAZOLE 60 MG/1
1 CAPSULE CAPSULE, DELAYED RELEASE ORAL ONCE A DAY
Qty: 90 | Refills: 3 | Status: ACTIVE | COMMUNITY
Start: 2020-07-01

## 2022-03-15 RX ORDER — FAMOTIDINE 40 MG/1
1 TABLET TABLET, FILM COATED ORAL BID
Qty: 180 TABLET | Refills: 3 | Status: ACTIVE | COMMUNITY
Start: 2021-07-21

## 2022-04-03 NOTE — EMERGENCY DEPARTMENT REPORT
ED General Adult HPI





- General


Chief complaint: Abdominal Pain


Stated complaint: NAUSEA/VOMITING/ABDOMIANL CRAMPS


Time Seen by Provider: 04/03/22 18:46


Source: patient


Mode of arrival: Ambulatory


Limitations: No Limitations





- History of Present Illness


Initial comments: 





37-year-old -American female patient presents with complaints of 

epigastric/right upper quadrant pain and nausea x4 days.  Patient reports a 

history of GERD and states that she is currently taking Dexilant daily.  Patient

also reports she had an endoscopy performed on 3/15/2022 that was negative for 

ulcers or H. pylori.  She reports pain seems to worsen immediately after eating.

 She rates her current pain as a 9/10 in severity.  No history of abdominal 

surgeries, fever/chills/sweats, urinary symptoms, vaginal pain/discharge, or 

dyspareunia per patient.  She also denies any cough or chest pain or shortness 

of breath





- Related Data


                                  Previous Rx's











 Medication  Instructions  Recorded  Last Taken  Type


 


LORazepam [Ativan] 0.5 mg PO BID PRN #20 tab 06/22/17 Unknown Rx


 


Ondansetron [Zofran Odt] 4 mg PO TID PRN #25 tab.rapdis 06/22/17 Unknown Rx


 


Promethazine [Phenergan TAB] 25 mg PO Q8HR PRN #25 tab 06/22/17 Unknown Rx


 


Loperamide [Imodium] 2 mg PO Q2HR #15 capsule 06/22/18 Unknown Rx


 


Mag Hydrox/Aluminum Hyd/Simeth 30 ml PO QID PRN #1 bottle 06/22/18 Unknown Rx





[Maalox Advanced Suspension]    


 


Ondansetron [Zofran Odt] 4 mg PO Q8H PRN #20 tab.rapdis 06/22/18 Unknown Rx


 


Fluconazole (Nf) [Diflucan TAB] 150 mg PO ONCE #1 tablet 09/08/18 Unknown Rx


 


HYDROcodone/APAP 5-325 [[place] 1 - 2 each PO Q6HR PRN #10 tablet 09/08/18 Unknown

 Rx





5/325]    


 


ALBUTEROL Inhaler(NF) [VENTOLIN 1 puff IH PRN #1 inha 12/04/18 Unknown Rx





Inhaler(NF)]    


 


Dextromethorphan HBr [Tussin Cough] 15 mg PO TID 7 Days #1 bottle 12/04/18 

Unknown Rx


 


Ibuprofen [Motrin 800 MG tab] 800 mg PO Q8HR PRN #20 tablet 12/04/18 Unknown Rx


 


predniSONE 10 mg PO QDAY #4 tab 12/04/18 Unknown Rx


 


Benzonatate [Tessalon Perles] 100 mg PO Q8HR PRN #20 capsule 01/08/19 Unknown Rx


 


Hydrocodone/Chlorphen P-Stirex 115 ml PO Q12HR PRN #180 cassie.er.12h 01/08/19 

Unknown Rx





[Tussionex Pennkinetic Susp]    


 


Fluconazole [Diflucan TAB] 100 mg PO BID #2 tablet 01/13/19 Unknown Rx


 


ALBUTEROL Inhaler(NF) [VENTOLIN 2 puff IH Q4HR #1 inha 02/10/19 Unknown Rx





Inhaler(NF)]    


 


Azithromycin [Zithromax Z-KIRSTY] 250 mg PO DAILY #6 tablet 02/10/19 Unknown Rx


 


Brompheniramine/Pseudoephed/Dm 10 ml PO Q4-6H PRN #200 syrup 02/10/19 Unknown Rx





[Bromfed Dm 2-30-10 mg/5 ml Syr]    


 


Diclofenac Sodium 50 mg PO BID #20 tablet.dr 02/10/19 Unknown Rx


 


predniSONE [Deltasone] 20 mg PO DAILY #15 tab 02/10/19 Unknown Rx


 


Albuterol Sulfate [Proventil Hfa] 6.7 gm IH BID #1 hfa.aer.ad 03/04/19 Unknown 

Rx


 


predniSONE [Deltasone] 20 mg PO DAILY #15 tablet 03/04/19 Unknown Rx


 


Azithromycin [Zithromax] 500 mg PO QDAY #3 tablet 07/20/21 Unknown Rx


 


Benzonatate [Tessalon Perles] 100 mg PO Q8HR #30 capsule 07/20/21 Unknown Rx


 


Montelukast [Singulair] 10 mg PO QPM #14 tablet 07/20/21 Unknown Rx


 


predniSONE [Deltasone] 50 mg PO QDAY #5 tab 07/20/21 Unknown Rx


 


Prednisone [predniSONE 10 mg 10 mg PO .TAPER #1 tab.ds.pk 09/05/21 Unknown Rx





(6-Day Pack, 21 Tabs)]    


 


Ondansetron [Zofran Odt] 4 mg PO Q8HR PRN #15 tab.rapdis 10/23/21 Unknown Rx


 


Azithromycin [Zithromax Z-KIRSTY] 250 mg PO DAILY #6 tablet 10/26/21 Unknown Rx


 


Benzonatate [Tessalon Perles] 100 mg PO Q8HR PRN #12 capsule 10/26/21 Unknown Rx


 


Acetaminophen/Codeine [Tylenol 1 tab PO Q8H PRN #15 tab 04/03/22 Unknown Rx





/Codeine # 3 tab]    


 


Dicyclomine [Bentyl] 20 mg PO QID PRN #30 tablet 04/03/22 Unknown Rx


 


Ondansetron [Zofran Odt] 4 mg PO Q8HR PRN #30 tab.rapdis 04/03/22 Unknown Rx











                                    Allergies











Allergy/AdvReac Type Severity Reaction Status Date / Time


 


hydroxyzine HCl Allergy  Shortness Verified 04/03/22 14:06





[From Vistaril]   of Breath  


 


hydroxyzine pamoate Allergy  Shortness Verified 04/03/22 14:06





[From Vistaril]   of Breath  


 


methocarbamol [From Robaxin] Allergy  Shortness Verified 04/03/22 14:06





   of Breath  


 


sulfamethoxazole Allergy  Hives Verified 04/03/22 14:06





[From Bactrim]     


 


trimethoprim [From Bactrim] Allergy  Hives Verified 04/03/22 14:06














ED Review of Systems


ROS: 


Stated complaint: NAUSEA/VOMITING/ABDOMIANL CRAMPS


Other details as noted in HPI





Constitutional: denies: chills, fever, malaise


Respiratory: denies: cough, shortness of breath


Cardiovascular: denies: chest pain


Gastrointestinal: abdominal pain, nausea.  denies: vomiting, diarrhea, 

constipation, hematemesis, melena, hematochezia


Genitourinary: denies: urgency, dysuria, frequency, hematuria, discharge, 

abnormal menses, dyspareunia


Musculoskeletal: denies: back pain


Skin: denies: change in color


Neurological: denies: headache





ED Past Medical Hx





- Past Medical History


Hx GERD: Yes


Hx Psychiatric Treatment: Yes (anxiety,depression)


Hx Asthma: Yes


Additional medical history: anemia, GERD





- Social History


Smoking Status: Never Smoker


Substance Use Type: None





- Medications


Home Medications: 


                                Home Medications











 Medication  Instructions  Recorded  Confirmed  Last Taken  Type


 


LORazepam [Ativan] 0.5 mg PO BID PRN #20 tab 06/22/17  Unknown Rx


 


Ondansetron [Zofran Odt] 4 mg PO TID PRN #25 tab.rapdis 06/22/17  Unknown Rx


 


Promethazine [Phenergan TAB] 25 mg PO Q8HR PRN #25 tab 06/22/17  Unknown Rx


 


Loperamide [Imodium] 2 mg PO Q2HR #15 capsule 06/22/18  Unknown Rx


 


Mag Hydrox/Aluminum Hyd/Simeth 30 ml PO QID PRN #1 bottle 06/22/18  Unknown Rx





[Maalox Advanced Suspension]     


 


Ondansetron [Zofran Odt] 4 mg PO Q8H PRN #20 tab.rapdis 06/22/18  Unknown Rx


 


Fluconazole (Nf) [Diflucan TAB] 150 mg PO ONCE #1 tablet 09/08/18  Unknown Rx


 


HYDROcodone/APAP 5-325 [[place] 1 - 2 each PO Q6HR PRN #10 tablet 09/08/18  

Unknown Rx





5/325]     


 


ALBUTEROL Inhaler(NF) [VENTOLIN 1 puff IH PRN #1 inha 12/04/18  Unknown Rx





Inhaler(NF)]     


 


Dextromethorphan HBr [Tussin Cough] 15 mg PO TID 7 Days #1 bottle 12/04/18  

Unknown Rx


 


Ibuprofen [Motrin 800 MG tab] 800 mg PO Q8HR PRN #20 tablet 12/04/18  Unknown Rx


 


predniSONE 10 mg PO QDAY #4 tab 12/04/18  Unknown Rx


 


Benzonatate [Tessalon Perles] 100 mg PO Q8HR PRN #20 capsule 01/08/19  Unknown 

Rx


 


Hydrocodone/Chlorphen P-Stirex 115 ml PO Q12HR PRN #180 cassie.er.12h 01/08/19  

Unknown Rx





[Tussionex Pennkinetic Susp]     


 


Fluconazole [Diflucan TAB] 100 mg PO BID #2 tablet 01/13/19  Unknown Rx


 


ALBUTEROL Inhaler(NF) [VENTOLIN 2 puff IH Q4HR #1 inha 02/10/19  Unknown Rx





Inhaler(NF)]     


 


Azithromycin [Zithromax Z-KIRSTY] 250 mg PO DAILY #6 tablet 02/10/19  Unknown Rx


 


Brompheniramine/Pseudoephed/Dm 10 ml PO Q4-6H PRN #200 syrup 02/10/19  Unknown 

Rx





[Bromfed Dm 2-30-10 mg/5 ml Syr]     


 


Diclofenac Sodium 50 mg PO BID #20 tablet. 02/10/19  Unknown Rx


 


predniSONE [Deltasone] 20 mg PO DAILY #15 tab 02/10/19  Unknown Rx


 


Albuterol Sulfate [Proventil Hfa] 6.7 gm IH BID #1 hfa.aer.ad 03/04/19  Unknown 

Rx


 


predniSONE [Deltasone] 20 mg PO DAILY #15 tablet 03/04/19  Unknown Rx


 


Azithromycin [Zithromax] 500 mg PO QDAY #3 tablet 07/20/21  Unknown Rx


 


Benzonatate [Tessalon Perles] 100 mg PO Q8HR #30 capsule 07/20/21  Unknown Rx


 


Montelukast [Singulair] 10 mg PO QPM #14 tablet 07/20/21  Unknown Rx


 


predniSONE [Deltasone] 50 mg PO QDAY #5 tab 07/20/21  Unknown Rx


 


Prednisone [predniSONE 10 mg 10 mg PO .TAPER #1 tab.ds.pk 09/05/21  Unknown Rx





(6-Day Pack, 21 Tabs)]     


 


Ondansetron [Zofran Odt] 4 mg PO Q8HR PRN #15 tab.rapdis 10/23/21  Unknown Rx


 


Azithromycin [Zithromax Z-KIRSTY] 250 mg PO DAILY #6 tablet 10/26/21  Unknown Rx


 


Benzonatate [Tessalon Perles] 100 mg PO Q8HR PRN #12 capsule 10/26/21  Unknown 

Rx


 


Acetaminophen/Codeine [Tylenol 1 tab PO Q8H PRN #15 tab 04/03/22  Unknown Rx





/Codeine # 3 tab]     


 


Dicyclomine [Bentyl] 20 mg PO QID PRN #30 tablet 04/03/22  Unknown Rx


 


Ondansetron [Zofran Odt] 4 mg PO Q8HR PRN #30 tab.rapdis 04/03/22  Unknown Rx














ED Physical Exam





- General


Limitations: No Limitations


General appearance: alert, in no apparent distress





- Head


Head exam: Present: atraumatic, normocephalic





- Eye


Eye exam: Present: normal appearance





- ENT


ENT exam: Present: normal exam





- Respiratory


Respiratory exam: Present: normal lung sounds bilaterally.  Absent: respiratory 

distress





- Cardiovascular


Cardiovascular Exam: Present: regular rate, normal rhythm





- GI/Abdominal


GI/Abdominal exam: Present: soft, tenderness, normal bowel sounds.  Absent: 

distended, guarding, rebound, rigid, mass





- Expanded GI/Abdominal Exam


  ** Expanded


GI/Abdominal exam: Present: Matos's sign.  Absent: tenderness at [place]'s 

Point





- Neurological Exam


Neurological exam: Present: alert, oriented X3





- Psychiatric


Psychiatric exam: Present: normal affect, normal mood





- Skin


Skin exam: Present: warm, dry, intact, normal color.  Absent: rash





ED Course


                                   Vital Signs











  04/03/22





  14:05


 


Temperature 98.7 F


 


Pulse Rate 100 H


 


Respiratory 16





Rate 


 


Blood Pressure 110/72


 


O2 Sat by Pulse 100





Oximetry 














ED Medical Decision Making





- Lab Data


Result diagrams: 


                                 04/03/22 15:00





                                 04/03/22 15:00





- Radiology Data


Radiology results: report reviewed





LIMITED RUQ ABDOMINAL ULTRASOUND   


 


 INDICATION / CLINICAL INFORMATION:  


 acute RUQ pain.  


 


 COMPARISON:  


 No relevant prior imaging study available.  


 


 FINDINGS:  


 PANCREAS: Visualized portions show no significant abnormality.  


 ABDOMINAL AORTA: No significant abnormality.  


 IVC: No significant abnormality..  


 LIVER: The liver measures 15.6 cm in length.  No significant abnormality. 

Normal hepatopedal blood 


flow in the main portal vein.  


 GALLBLADDER: Calcified gallstones and gallbladder sludge.  


 BILE DUCTS: No significant abnormality. Common bile duct measures 4 mm.   


 RIGHT KIDNEY: No significant abnormality visualized.  


 


 FREE FLUID: None.  


 ADDITIONAL FINDINGS: None.  


 


 IMPRESSION:  


 1. Cholelithiasis.  


 





- Medical Decision Making








37-year-old -American female patient presents with complaints of ep

igastric/right upper quadrant pain and nausea x4 days.  Patient reports a 

history of GERD and states that she is currently taking Dexilant daily.  Patient

also reports she had an endoscopy performed on 3/15/2022 that was negative for 

ulcers or H. pylori.  She reports pain seems to worsen immediately after eating.

 She rates her current pain as a 9/10 in severity.  No history of abdominal 

surgeries, fever/chills/sweats, urinary symptoms, vaginal pain/discharge, or 

dyspareunia per patient.  She also denies any cough or chest pain or shortness 

of breath





No acute abnormalities noted on labs.  Ultrasound shows cholelithiasis without 

cholecystitis.  Suspect biliary colic.  Recommend patient follows up with her GI

specialist for further evaluation and treatment.  Meds given for home for pain 

control.  She is otherwise well-appearing, her vitals are within normal limits, 

she is stable for discharge home.  Strict return precautions were discussed in 

detail with patient who verbalizes understanding


Critical care attestation.: 


If time is entered above; I have spent that time in minutes in the direct care 

of this critically ill patient, excluding procedure time.








ED Disposition


Clinical Impression: 


 Biliary colic, Upper abdominal pain





Disposition: 01 HOME / SELF CARE / HOMELESS


Is pt being admited?: No


Condition: Stable


Instructions:  Abdominal Pain (ED), Biliary Colic, Adult, Cholelithiasis, 

Easy-to-Read


Additional Instructions: 


Please follow-up with your gastroenterologist within 1 week


Prescriptions: 


Dicyclomine [Bentyl] 20 mg PO QID PRN #30 tablet


 PRN Reason: abdominal pain


Acetaminophen/Codeine [Tylenol /Codeine # 3 tab] 1 tab PO Q8H PRN #15 tab


 PRN Reason: Pain , Severe (7-10)


Ondansetron [Zofran Odt] 4 mg PO Q8HR PRN #30 tab.rapdis


 PRN Reason: Nausea


Forms:  Work/School Release Form(ED)

## 2022-04-03 NOTE — ULTRASOUND REPORT
LIMITED RUQ ABDOMINAL ULTRASOUND 



INDICATION / CLINICAL INFORMATION:

acute RUQ pain.



COMPARISON:

No relevant prior imaging study available.



FINDINGS:

PANCREAS: Visualized portions show no significant abnormality.

ABDOMINAL AORTA: No significant abnormality.

IVC: No significant abnormality..

LIVER: The liver measures 15.6 cm in length.  No significant abnormality. Normal hepatopedal blood fl
ow in the main portal vein.

GALLBLADDER: Calcified gallstones and gallbladder sludge.

BILE DUCTS: No significant abnormality. Common bile duct measures 4 mm. 

RIGHT KIDNEY: No significant abnormality visualized.



FREE FLUID: None.

ADDITIONAL FINDINGS: None.



IMPRESSION:

1. Cholelithiasis.



Signer Name: Dexter Patterson MD 

Signed: 4/3/2022 8:20 PM

Workstation Name: [id number]

## 2022-04-06 ENCOUNTER — OFFICE VISIT (OUTPATIENT)
Dept: URBAN - METROPOLITAN AREA TELEHEALTH 2 | Facility: TELEHEALTH | Age: 37
End: 2022-04-06
Payer: COMMERCIAL

## 2022-04-06 DIAGNOSIS — E55.9 VITAMIN D DEFICIENCY DISEASE: ICD-10-CM

## 2022-04-06 DIAGNOSIS — K21.00 GASTROESOPHAGEAL REFLUX DISEASE WITH ESOPHAGITIS WITHOUT HEMORRHAGE: ICD-10-CM

## 2022-04-06 DIAGNOSIS — R10.33 PERIUMBILICAL ABDOMINAL CRAMPING: ICD-10-CM

## 2022-04-06 DIAGNOSIS — Z91.11 DIETARY NONCOMPLIANCE: ICD-10-CM

## 2022-04-06 DIAGNOSIS — R11.12 PROJECTILE VOMITING WITH NAUSEA: ICD-10-CM

## 2022-04-06 DIAGNOSIS — K58.1 IRRITABLE BOWEL SYNDROME WITH CONSTIPATION: ICD-10-CM

## 2022-04-06 PROBLEM — 440630006: Status: ACTIVE | Noted: 2022-04-06

## 2022-04-06 PROBLEM — 129832003: Status: ACTIVE | Noted: 2022-04-06

## 2022-04-06 PROBLEM — 266433003: Status: ACTIVE | Noted: 2022-04-06

## 2022-04-06 PROCEDURE — 99214 OFFICE O/P EST MOD 30 MIN: CPT | Performed by: INTERNAL MEDICINE

## 2022-04-06 RX ORDER — DEXLANSOPRAZOLE 60 MG/1
1 CAPSULE CAPSULE, DELAYED RELEASE ORAL ONCE A DAY
Qty: 90 | Refills: 3 | Status: ACTIVE | COMMUNITY
Start: 2020-07-01

## 2022-04-06 RX ORDER — FAMOTIDINE 40 MG/1
1 TABLET TABLET, FILM COATED ORAL BID
Qty: 180 TABLET | Refills: 3 | Status: ACTIVE | COMMUNITY
Start: 2021-07-21

## 2022-04-06 RX ORDER — DILTIAZEM HYDROCHLORIDE 30 MG/1
ONE TABLET TABLET, FILM COATED ORAL DAILY
Qty: 90 | Refills: 3 | Status: ACTIVE | COMMUNITY
Start: 2020-08-10

## 2022-04-06 NOTE — HPI-TODAY'S VISIT:
The patient has a history of GERD, dyspepsia and  constipation who presents for a f/u office visiti. The pt has had consistent  abdominal pain with associated n/v. The pt was seen a [facility] approx. 3 days and abd us/ reveals gallstones. The pt had labs normal. The pt had a gastric emptying scan 2 years ago which was nornal. The pt denies melena, hematemesis or hematochezia. 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

## 2022-04-20 ENCOUNTER — OFFICE VISIT (OUTPATIENT)
Dept: URBAN - METROPOLITAN AREA TELEHEALTH 2 | Facility: TELEHEALTH | Age: 37
End: 2022-04-20
Payer: COMMERCIAL

## 2022-04-20 DIAGNOSIS — R10.10 UPPER ABDOMINAL PAIN: ICD-10-CM

## 2022-04-20 DIAGNOSIS — K59.09 CHRONIC CONSTIPATION: ICD-10-CM

## 2022-04-20 DIAGNOSIS — K80.20 GALLSTONES: ICD-10-CM

## 2022-04-20 DIAGNOSIS — E55.9 VITAMIN D DEFICIENCY DISEASE: ICD-10-CM

## 2022-04-20 PROBLEM — 34713006: Status: ACTIVE | Noted: 2020-08-10

## 2022-04-20 PROBLEM — 235919008: Status: ACTIVE | Noted: 2022-04-20

## 2022-04-20 PROCEDURE — 99214 OFFICE O/P EST MOD 30 MIN: CPT | Performed by: INTERNAL MEDICINE

## 2022-04-20 RX ORDER — DILTIAZEM HYDROCHLORIDE 30 MG/1
ONE TABLET TABLET, FILM COATED ORAL DAILY
Qty: 90 | Refills: 3 | Status: ACTIVE | COMMUNITY
Start: 2020-08-10

## 2022-04-20 RX ORDER — DEXLANSOPRAZOLE 60 MG/1
1 CAPSULE CAPSULE, DELAYED RELEASE ORAL ONCE A DAY
Qty: 90 | Refills: 3 | Status: ACTIVE | COMMUNITY
Start: 2020-07-01

## 2022-04-20 RX ORDER — FAMOTIDINE 40 MG/1
1 TABLET TABLET, FILM COATED ORAL BID
Qty: 180 TABLET | Refills: 3 | Status: ACTIVE | COMMUNITY
Start: 2021-07-21

## 2022-04-20 NOTE — HPI-TODAY'S VISIT:
The patient has a history of gallstones and chronic constipation who presents for evaluation of upper abdominal pain . Of note, the pt is s/p ER visit at [facility] and abd us revealed galllstones. I ordered a CT which has not been done. The pt is taking Miralax BID for constipaton wtih no other acute problems. I will also review labs as well.

## 2022-05-07 NOTE — EMERGENCY DEPARTMENT REPORT
ED Shortness of Breath HPI





- General


Chief Complaint: Adult Asthma


Stated Complaint: ASTHMA FLARE UP


Source: patient


Mode of arrival: Ambulatory


Limitations: No Limitations





- History of Present Illness


MD Complaint: shortness of breath, cough, chest pain (Tightness), "asthma 

attack"


-: Sudden, week(s) (1)


Severity: moderate


Pain Scale: 5


Quality: dull, other (tightness)


Consistency: constant


Improves With: nothing


Worsens With: nothing


Known History Of: asthma


Context: recent URI, allergen exposure


Associated Symptoms: chest pain (tightness), cough


Treatments Prior to Arrival: bronchodilator





- Related Data


Home Oxygen Therapy: No


                                  Previous Rx's











 Medication  Instructions  Recorded  Last Taken  Type


 


LORazepam [Ativan] 0.5 mg PO BID PRN #20 tab 06/22/17 Unknown Rx


 


Ondansetron [Zofran Odt] 4 mg PO TID PRN #25 tab.rapdis 06/22/17 Unknown Rx


 


Promethazine [Phenergan TAB] 25 mg PO Q8HR PRN #25 tab 06/22/17 Unknown Rx


 


Loperamide [Imodium] 2 mg PO Q2HR #15 capsule 06/22/18 Unknown Rx


 


Mag Hydrox/Aluminum Hyd/Simeth 30 ml PO QID PRN #1 bottle 06/22/18 Unknown Rx





[Maalox Advanced Suspension]    


 


Ondansetron [Zofran Odt] 4 mg PO Q8H PRN #20 tab.rapdis 06/22/18 Unknown Rx


 


Fluconazole (Nf) [Diflucan TAB] 150 mg PO ONCE #1 tablet 09/08/18 Unknown Rx


 


HYDROcodone/APAP 5-325 [[place] 1 - 2 each PO Q6HR PRN #10 tablet 09/08/18 Unknown

 Rx





5/325]    


 


ALBUTEROL Inhaler(NF) [VENTOLIN 1 puff IH PRN #1 inha 12/04/18 Unknown Rx





Inhaler(NF)]    


 


Dextromethorphan HBr [Tussin Cough] 15 mg PO TID 7 Days #1 bottle 12/04/18 

Unknown Rx


 


Ibuprofen [Motrin 800 MG tab] 800 mg PO Q8HR PRN #20 tablet 12/04/18 Unknown Rx


 


predniSONE 10 mg PO QDAY #4 tab 12/04/18 Unknown Rx


 


Benzonatate [Tessalon Perles] 100 mg PO Q8HR PRN #20 capsule 01/08/19 Unknown Rx


 


Hydrocodone/Chlorphen P-Stirex 115 ml PO Q12HR PRN #180 cassie.er.12h 01/08/19 

Unknown Rx





[Tussionex Pennkinetic Susp]    


 


Fluconazole [Diflucan TAB] 100 mg PO BID #2 tablet 01/13/19 Unknown Rx


 


ALBUTEROL Inhaler(NF) [VENTOLIN 2 puff IH Q4HR #1 inha 02/10/19 Unknown Rx





Inhaler(NF)]    


 


Azithromycin [Zithromax Z-KIRSTY] 250 mg PO DAILY #6 tablet 02/10/19 Unknown Rx


 


Brompheniramine/Pseudoephed/Dm 10 ml PO Q4-6H PRN #200 syrup 02/10/19 Unknown Rx





[Bromfed Dm 2-30-10 mg/5 ml Syr]    


 


Diclofenac Sodium 50 mg PO BID #20 tablet.dr 02/10/19 Unknown Rx


 


predniSONE [Deltasone] 20 mg PO DAILY #15 tab 02/10/19 Unknown Rx


 


Albuterol Sulfate [Proventil Hfa] 6.7 gm IH BID #1 hfa.aer.ad 03/04/19 Unknown 

Rx


 


predniSONE [Deltasone] 20 mg PO DAILY #15 tablet 03/04/19 Unknown Rx


 


Azithromycin [Zithromax] 500 mg PO QDAY #3 tablet 07/20/21 Unknown Rx


 


predniSONE [Deltasone] 50 mg PO QDAY #5 tab 07/20/21 Unknown Rx


 


Ondansetron [Zofran Odt] 4 mg PO Q8HR PRN #15 tab.rapdis 10/23/21 Unknown Rx


 


Azithromycin [Zithromax Z-KIRSTY] 250 mg PO DAILY #6 tablet 10/26/21 Unknown Rx


 


Benzonatate [Tessalon Perles] 100 mg PO Q8HR PRN #12 capsule 10/26/21 Unknown Rx


 


Acetaminophen/Codeine [Tylenol 1 tab PO Q8H PRN #15 tab 04/03/22 Unknown Rx





/Codeine # 3 tab]    


 


Dicyclomine [Bentyl] 20 mg PO QID PRN #30 tablet 04/03/22 Unknown Rx


 


Ondansetron [Zofran Odt] 4 mg PO Q8HR PRN #30 tab.rapdis 04/03/22 Unknown Rx


 


Benzonatate [Tessalon Perles] 100 mg PO Q8HR #30 capsule 05/07/22 Unknown Rx


 


Montelukast [Singulair] 10 mg PO QPM #30 tablet 05/07/22 Unknown Rx


 


Prednisone [predniSONE 10 mg 10 mg PO .TAPER #1 tab.ds.pk 05/07/22 Unknown Rx





(6-Day Pack, 21 Tabs)]    











                                    Allergies











Allergy/AdvReac Type Severity Reaction Status Date / Time


 


hydroxyzine HCl Allergy  Shortness Verified 04/03/22 14:06





[From Vistaril]   of Breath  


 


hydroxyzine pamoate Allergy  Shortness Verified 04/03/22 14:06





[From Vistaril]   of Breath  


 


methocarbamol [From Robaxin] Allergy  Shortness Verified 04/03/22 14:06





   of Breath  


 


sulfamethoxazole Allergy  Hives Verified 04/03/22 14:06





[From Bactrim]     


 


trimethoprim [From Bactrim] Allergy  Hives Verified 04/03/22 14:06














ED Review of Systems


ROS: 


Stated complaint: ASTHMA FLARE UP


Other details as noted in HPI








ED Past Medical Hx





- Past Medical History


Hx GERD: Yes


Hx Psychiatric Treatment: Yes (anxiety,depression)


Hx Asthma: Yes


Additional medical history: anemia, GERD





- Social History


Smoking Status: Never Smoker


Substance Use Type: None





- Medications


Home Medications: 


                                Home Medications











 Medication  Instructions  Recorded  Confirmed  Last Taken  Type


 


LORazepam [Ativan] 0.5 mg PO BID PRN #20 tab 06/22/17  Unknown Rx


 


Ondansetron [Zofran Odt] 4 mg PO TID PRN #25 tab.rapdis 06/22/17  Unknown Rx


 


Promethazine [Phenergan TAB] 25 mg PO Q8HR PRN #25 tab 06/22/17  Unknown Rx


 


Loperamide [Imodium] 2 mg PO Q2HR #15 capsule 06/22/18  Unknown Rx


 


Mag Hydrox/Aluminum Hyd/Simeth 30 ml PO QID PRN #1 bottle 06/22/18  Unknown Rx





[Maalox Advanced Suspension]     


 


Ondansetron [Zofran Odt] 4 mg PO Q8H PRN #20 tab.rapdis 06/22/18  Unknown Rx


 


Fluconazole (Nf) [Diflucan TAB] 150 mg PO ONCE #1 tablet 09/08/18  Unknown Rx


 


HYDROcodone/APAP 5-325 [[place] 1 - 2 each PO Q6HR PRN #10 tablet 09/08/18  

Unknown Rx





5/325]     


 


ALBUTEROL Inhaler(NF) [VENTOLIN 1 puff IH PRN #1 inha 12/04/18  Unknown Rx





Inhaler(NF)]     


 


Dextromethorphan HBr [Tussin Cough] 15 mg PO TID 7 Days #1 bottle 12/04/18  

Unknown Rx


 


Ibuprofen [Motrin 800 MG tab] 800 mg PO Q8HR PRN #20 tablet 12/04/18  Unknown Rx


 


predniSONE 10 mg PO QDAY #4 tab 12/04/18  Unknown Rx


 


Benzonatate [Tessalon Perles] 100 mg PO Q8HR PRN #20 capsule 01/08/19  Unknown 

Rx


 


Hydrocodone/Chlorphen P-Stirex 115 ml PO Q12HR PRN #180 cassie.er.12h 01/08/19  

Unknown Rx





[Tussionex Pennkinetic Susp]     


 


Fluconazole [Diflucan TAB] 100 mg PO BID #2 tablet 01/13/19  Unknown Rx


 


ALBUTEROL Inhaler(NF) [VENTOLIN 2 puff IH Q4HR #1 inha 02/10/19  Unknown Rx





Inhaler(NF)]     


 


Azithromycin [Zithromax Z-KIRSTY] 250 mg PO DAILY #6 tablet 02/10/19  Unknown Rx


 


Brompheniramine/Pseudoephed/Dm 10 ml PO Q4-6H PRN #200 syrup 02/10/19  Unknown 

Rx





[Bromfed Dm 2-30-10 mg/5 ml Syr]     


 


Diclofenac Sodium 50 mg PO BID #20 tablet.dr 02/10/19  Unknown Rx


 


predniSONE [Deltasone] 20 mg PO DAILY #15 tab 02/10/19  Unknown Rx


 


Albuterol Sulfate [Proventil Hfa] 6.7 gm IH BID #1 hfa.aer.ad 03/04/19  Unknown 

Rx


 


predniSONE [Deltasone] 20 mg PO DAILY #15 tablet 03/04/19  Unknown Rx


 


Azithromycin [Zithromax] 500 mg PO QDAY #3 tablet 07/20/21  Unknown Rx


 


predniSONE [Deltasone] 50 mg PO QDAY #5 tab 07/20/21  Unknown Rx


 


Ondansetron [Zofran Odt] 4 mg PO Q8HR PRN #15 tab.rapdis 10/23/21  Unknown Rx


 


Azithromycin [Zithromax Z-KIRSTY] 250 mg PO DAILY #6 tablet 10/26/21  Unknown Rx


 


Benzonatate [Tessalon Perles] 100 mg PO Q8HR PRN #12 capsule 10/26/21  Unknown 

Rx


 


Acetaminophen/Codeine [Tylenol 1 tab PO Q8H PRN #15 tab 04/03/22  Unknown Rx





/Codeine # 3 tab]     


 


Dicyclomine [Bentyl] 20 mg PO QID PRN #30 tablet 04/03/22  Unknown Rx


 


Ondansetron [Zofran Odt] 4 mg PO Q8HR PRN #30 tab.rapdis 04/03/22  Unknown Rx


 


Benzonatate [Tessalon Perles] 100 mg PO Q8HR #30 capsule 05/07/22  Unknown Rx


 


Montelukast [Singulair] 10 mg PO QPM #30 tablet 05/07/22  Unknown Rx


 


Prednisone [predniSONE 10 mg 10 mg PO .TAPER #1 tab.ds.pk 05/07/22  Unknown Rx





(6-Day Pack, 21 Tabs)]     














ED Physical Exam





- General


Limitations: No Limitations





ED Course





                                   Vital Signs











  05/06/22 05/07/22 05/07/22





  20:45 00:04 00:05


 


Temperature 98.9 F  


 


Pulse Rate 84 88 


 


Pulse Rate [   





Anterior   





Bilateral   





Throughout]   


 


Pulse Rate [   





Posterior   





Bilateral   





Throughout]   


 


Respiratory 12 18 





Rate   


 


Respiratory   





Rate [Anterior   





Bilateral   





Throughout]   


 


Respiratory   





Rate [Posterior   





Bilateral   





Throughout]   


 


Blood Pressure 120/81  


 


Blood Pressure  114/75 





[Left]   


 


O2 Sat by Pulse 100 96 98





Oximetry   














  05/07/22





  00:45


 


Temperature 


 


Pulse Rate 


 


Pulse Rate [ 85





Anterior 





Bilateral 





Throughout] 


 


Pulse Rate [ 88





Posterior 





Bilateral 





Throughout] 


 


Respiratory 





Rate 


 


Respiratory 16





Rate [Anterior 





Bilateral 





Throughout] 


 


Respiratory 16





Rate [Posterior 





Bilateral 





Throughout] 


 


Blood Pressure 


 


Blood Pressure 





[Left] 


 


O2 Sat by Pulse 





Oximetry 











Critical care attestation.: 


If time is entered above; I have spent that time in minutes in the direct care 

of this critically ill patient, excluding procedure time.








ED Disposition


Clinical Impression: 


 Shortness of breath





Asthmatic bronchitis with acute exacerbation


Qualifiers:


 Asthma severity: mild Asthma persistence: persistent Qualified Code(s): J45.31 

- Mild persistent asthma with (acute) exacerbation





Disposition: 01 HOME / SELF CARE / HOMELESS


Is pt being admited?: No


Does the pt Need Aspirin: No


Condition: Stable


Instructions:  Shortness of Breath, Adult, Easy-to-Read, Cough, Adult, Easy-to-

Read, Asthma, Adult, Easy-to-Read, Chronic Bronchitis, Adult


Additional Instructions: 


Take medication with food, drink plenty fluids and follow-up with your primary 

care physician in 3 to 5 days for reevaluation.  Return to the ED immediately if

 symptoms get worse.


Prescriptions: 


Prednisone [predniSONE 10 mg (6-Day Pack, 21 Tabs)] 10 mg PO .TAPER #1 tab.ds.pk


Montelukast [Singulair] 10 mg PO QPM #30 tablet


Benzonatate [Tessalon Perles] 100 mg PO Q8HR #30 capsule


Referrals: 


[facility] [Provider Group] - 3-5 Days


Time of Disposition: 01:07


Print Language: ENGLISH

## 2022-06-26 NOTE — EMERGENCY DEPARTMENT REPORT
ED Allergic Reaction HPI





- General


Chief complaint: Allergic Reaction


Stated complaint: ALLERGIC REACTION


Time Seen by Provider: 06/26/22 01:55


Source: patient


Mode of arrival: Ambulatory


Limitations: No Limitations





- History of Present Illness


Initial Comments: 





37-year-old American female past medical history of asthma presents emerged 

department complaining of having a reaction to the Motrin that she had taken.  

Reports taking Motrin but to have [place] and then began to develop wheezing and 

tightness across her chest with diffuse pruritus.  Reports no fever, chills, 

sweats.  No nausea, no vomiting, no diarrhea, no hemoptysis no hematemesis 

hematochezia


MD Complaint: allergic reaction


-: Gradual


Severity: mild, moderate


Treatment Prior to Arrival: none


Previous Allergy History: none





- Related Data


                                  Previous Rx's











 Medication  Instructions  Recorded  Last Taken  Type


 


LORazepam [Ativan] 0.5 mg PO BID PRN #20 tab 06/22/17 Unknown Rx


 


Ondansetron [Zofran Odt] 4 mg PO TID PRN #25 tab.rapdis 06/22/17 Unknown Rx


 


Promethazine [Phenergan TAB] 25 mg PO Q8HR PRN #25 tab 06/22/17 Unknown Rx


 


Loperamide [Imodium] 2 mg PO Q2HR #15 capsule 06/22/18 Unknown Rx


 


Mag Hydrox/Aluminum Hyd/Simeth 30 ml PO QID PRN #1 bottle 06/22/18 Unknown Rx





[Maalox Advanced Suspension]    


 


Ondansetron [Zofran Odt] 4 mg PO Q8H PRN #20 tab.rapdis 06/22/18 Unknown Rx


 


Fluconazole (Nf) [Diflucan TAB] 150 mg PO ONCE #1 tablet 09/08/18 Unknown Rx


 


HYDROcodone/APAP 5-325 [[place] 1 - 2 each PO Q6HR PRN #10 tablet 09/08/18 Unknown

 Rx





5/325]    


 


ALBUTEROL Inhaler(NF) [VENTOLIN 1 puff IH PRN #1 inha 12/04/18 Unknown Rx





Inhaler(NF)]    


 


Dextromethorphan HBr [Tussin Cough] 15 mg PO TID 7 Days #1 bottle 12/04/18 

Unknown Rx


 


Ibuprofen [Motrin 800 MG tab] 800 mg PO Q8HR PRN #20 tablet 12/04/18 Unknown Rx


 


predniSONE 10 mg PO QDAY #4 tab 12/04/18 Unknown Rx


 


Benzonatate [Tessalon Perles] 100 mg PO Q8HR PRN #20 capsule 01/08/19 Unknown Rx


 


Hydrocodone/Chlorphen P-Stirex 115 ml PO Q12HR PRN #180 cassie.er.12h 01/08/19 

Unknown Rx





[Tussionex Pennkinetic Susp]    


 


Fluconazole [Diflucan TAB] 100 mg PO BID #2 tablet 01/13/19 Unknown Rx


 


ALBUTEROL Inhaler(NF) [VENTOLIN 2 puff IH Q4HR #1 inha 02/10/19 Unknown Rx





Inhaler(NF)]    


 


Azithromycin [Zithromax Z-KIRSTY] 250 mg PO DAILY #6 tablet 02/10/19 Unknown Rx


 


Brompheniramine/Pseudoephed/Dm 10 ml PO Q4-6H PRN #200 syrup 02/10/19 Unknown Rx





[Bromfed Dm 2-30-10 mg/5 ml Syr]    


 


Diclofenac Sodium 50 mg PO BID #20 tablet.dr 02/10/19 Unknown Rx


 


predniSONE [Deltasone] 20 mg PO DAILY #15 tab 02/10/19 Unknown Rx


 


Albuterol Sulfate [Proventil Hfa] 6.7 gm IH BID #1 hfa.aer.ad 03/04/19 Unknown 

Rx


 


predniSONE [Deltasone] 20 mg PO DAILY #15 tablet 03/04/19 Unknown Rx


 


Azithromycin [Zithromax] 500 mg PO QDAY #3 tablet 07/20/21 Unknown Rx


 


predniSONE [Deltasone] 50 mg PO QDAY #5 tab 07/20/21 Unknown Rx


 


Ondansetron [Zofran Odt] 4 mg PO Q8HR PRN #15 tab.rapdis 10/23/21 Unknown Rx


 


Azithromycin [Zithromax Z-KIRSTY] 250 mg PO DAILY #6 tablet 10/26/21 Unknown Rx


 


Benzonatate [Tessalon Perles] 100 mg PO Q8HR PRN #12 capsule 10/26/21 Unknown Rx


 


Acetaminophen/Codeine [Tylenol 1 tab PO Q8H PRN #15 tab 04/03/22 Unknown Rx





/Codeine # 3 tab]    


 


Dicyclomine [Bentyl] 20 mg PO QID PRN #30 tablet 04/03/22 Unknown Rx


 


Ondansetron [Zofran Odt] 4 mg PO Q8HR PRN #30 tab.rapdis 04/03/22 Unknown Rx


 


Benzonatate [Tessalon Perles] 100 mg PO Q8HR #30 capsule 05/07/22 Unknown Rx


 


Montelukast [Singulair] 10 mg PO QPM #30 tablet 05/07/22 Unknown Rx


 


Prednisone [predniSONE 10 mg 10 mg PO .TAPER #1 tab.ds.pk 05/07/22 Unknown Rx





(6-Day Pack, 21 Tabs)]    


 


Desloratadine [Clarinex] 5 mg PO DAILY #7 06/26/22 Unknown Rx


 


Montelukast [Singulair] 10 mg PO QPM #14 tablet 06/26/22 Unknown Rx


 


predniSONE [Deltasone] 20 mg PO QDAY #5 tab 06/26/22 Unknown Rx











                                    Allergies











Allergy/AdvReac Type Severity Reaction Status Date / Time


 


hydroxyzine HCl Allergy  Shortness Verified 06/25/22 21:36





[From Vistaril]   of Breath  


 


hydroxyzine pamoate Allergy  Shortness Verified 06/25/22 21:36





[From Vistaril]   of Breath  


 


ibuprofen Allergy  Itching Verified 06/25/22 21:36


 


methocarbamol [From Robaxin] Allergy  Shortness Verified 06/25/22 21:36





   of Breath  


 


sulfamethoxazole Allergy  Hives Verified 06/25/22 21:36





[From Bactrim]     


 


trimethoprim [From Bactrim] Allergy  Hives Verified 06/25/22 21:36














ED Review of Systems


ROS: 


Stated complaint: ALLERGIC REACTION


Other details as noted in HPI





Comment: All other systems reviewed and negative





ED Past Medical Hx





- Past Medical History


Previous Medical History?: Yes


Hx GERD: Yes


Hx Psychiatric Treatment: Yes (anxiety,depression)


Hx Asthma: Yes


Additional medical history: anemia, GERD





- Surgical History


Past Surgical History?: No





- Social History


Smoking Status: Never Smoker


Substance Use Type: None





- Medications


Home Medications: 


                                Home Medications











 Medication  Instructions  Recorded  Confirmed  Last Taken  Type


 


LORazepam [Ativan] 0.5 mg PO BID PRN #20 tab 06/22/17  Unknown Rx


 


Ondansetron [Zofran Odt] 4 mg PO TID PRN #25 tab.rapdis 06/22/17  Unknown Rx


 


Promethazine [Phenergan TAB] 25 mg PO Q8HR PRN #25 tab 06/22/17  Unknown Rx


 


Loperamide [Imodium] 2 mg PO Q2HR #15 capsule 06/22/18  Unknown Rx


 


Mag Hydrox/Aluminum Hyd/Simeth 30 ml PO QID PRN #1 bottle 06/22/18  Unknown Rx





[Maalox Advanced Suspension]     


 


Ondansetron [Zofran Odt] 4 mg PO Q8H PRN #20 tab.rapdis 06/22/18  Unknown Rx


 


Fluconazole (Nf) [Diflucan TAB] 150 mg PO ONCE #1 tablet 09/08/18  Unknown Rx


 


HYDROcodone/APAP 5-325 [[place] 1 - 2 each PO Q6HR PRN #10 tablet 09/08/18  

Unknown Rx





5/325]     


 


ALBUTEROL Inhaler(NF) [VENTOLIN 1 puff IH PRN #1 inha 12/04/18  Unknown Rx





Inhaler(NF)]     


 


Dextromethorphan HBr [Tussin Cough] 15 mg PO TID 7 Days #1 bottle 12/04/18  

Unknown Rx


 


Ibuprofen [Motrin 800 MG tab] 800 mg PO Q8HR PRN #20 tablet 12/04/18  Unknown Rx


 


predniSONE 10 mg PO QDAY #4 tab 12/04/18  Unknown Rx


 


Benzonatate [Tessalon Perles] 100 mg PO Q8HR PRN #20 capsule 01/08/19  Unknown 

Rx


 


Hydrocodone/Chlorphen P-Stirex 115 ml PO Q12HR PRN #180 cassie.er.12h 01/08/19  

Unknown Rx





[Tussionex Pennkinetic Susp]     


 


Fluconazole [Diflucan TAB] 100 mg PO BID #2 tablet 01/13/19  Unknown Rx


 


ALBUTEROL Inhaler(NF) [VENTOLIN 2 puff IH Q4HR #1 inha 02/10/19  Unknown Rx





Inhaler(NF)]     


 


Azithromycin [Zithromax Z-KIRSTY] 250 mg PO DAILY #6 tablet 02/10/19  Unknown Rx


 


Brompheniramine/Pseudoephed/Dm 10 ml PO Q4-6H PRN #200 syrup 02/10/19  Unknown 

Rx





[Bromfed Dm 2-30-10 mg/5 ml Syr]     


 


Diclofenac Sodium 50 mg PO BID #20 tablet.dr 02/10/19  Unknown Rx


 


predniSONE [Deltasone] 20 mg PO DAILY #15 tab 02/10/19  Unknown Rx


 


Albuterol Sulfate [Proventil Hfa] 6.7 gm IH BID #1 hfa.aer.ad 03/04/19  Unknown 

Rx


 


predniSONE [Deltasone] 20 mg PO DAILY #15 tablet 03/04/19  Unknown Rx


 


Azithromycin [Zithromax] 500 mg PO QDAY #3 tablet 07/20/21  Unknown Rx


 


predniSONE [Deltasone] 50 mg PO QDAY #5 tab 07/20/21  Unknown Rx


 


Ondansetron [Zofran Odt] 4 mg PO Q8HR PRN #15 tab.rapdis 10/23/21  Unknown Rx


 


Azithromycin [Zithromax Z-KIRSTY] 250 mg PO DAILY #6 tablet 10/26/21  Unknown Rx


 


Benzonatate [Tessalon Perles] 100 mg PO Q8HR PRN #12 capsule 10/26/21  Unknown 

Rx


 


Acetaminophen/Codeine [Tylenol 1 tab PO Q8H PRN #15 tab 04/03/22  Unknown Rx





/Codeine # 3 tab]     


 


Dicyclomine [Bentyl] 20 mg PO QID PRN #30 tablet 04/03/22  Unknown Rx


 


Ondansetron [Zofran Odt] 4 mg PO Q8HR PRN #30 tab.rapdis 04/03/22  Unknown Rx


 


Benzonatate [Tessalon Perles] 100 mg PO Q8HR #30 capsule 05/07/22  Unknown Rx


 


Montelukast [Singulair] 10 mg PO QPM #30 tablet 05/07/22  Unknown Rx


 


Prednisone [predniSONE 10 mg 10 mg PO .TAPER #1 tab.ds.pk 05/07/22  Unknown Rx





(6-Day Pack, 21 Tabs)]     


 


Desloratadine [Clarinex] 5 mg PO DAILY #7 06/26/22  Unknown Rx


 


Montelukast [Singulair] 10 mg PO QPM #14 tablet 06/26/22  Unknown Rx


 


predniSONE [Deltasone] 20 mg PO QDAY #5 tab 06/26/22  Unknown Rx














ED Physical Exam





- General


Limitations: No Limitations


General appearance: alert, in no apparent distress





- Head


Head exam: Present: atraumatic, normocephalic





- Eye


Eye exam: Present: normal appearance, PERRL, EOMI


Pupils: Present: normal accommodation





- ENT


ENT exam: Present: mucous membranes moist





- Neck


Neck exam: Present: normal inspection





- Respiratory


Respiratory exam: Present: normal lung sounds bilaterally.  Absent: respiratory 

distress





- Cardiovascular


Cardiovascular Exam: Present: regular rate, normal rhythm.  Absent: systolic 

murmur, diastolic murmur, rubs, gallop





- GI/Abdominal


GI/Abdominal exam: Present: soft, normal bowel sounds





- Extremities Exam


Extremities exam: Present: normal inspection





- Back Exam


Back exam: Present: normal inspection





- Neurological Exam


Neurological exam: Present: alert, oriented X3





- Psychiatric


Psychiatric exam: Present: normal affect, normal mood





- Skin


Skin exam: Present: warm, dry, intact, normal color.  Absent: rash





ED Course





                                   Vital Signs











  06/25/22





  21:33


 


Temperature 98.5 F


 


Pulse Rate 91 H


 


Respiratory 18





Rate 


 


Blood Pressure 121/80





[Left] 


 


O2 Sat by Pulse 100





Oximetry 











Critical care attestation.: 


If time is entered above; I have spent that time in minutes in the direct care 

of this critically ill patient, excluding procedure time.








ED Disposition


Clinical Impression: 


 Allergic reaction





Disposition: 01 HOME / SELF CARE / HOMELESS


Is pt being admited?: No


Does the pt Need Aspirin: No


Condition: Stable


Instructions:  Allergies, Adult, Easy-to-Read, How to Use an Auto-Injector Pen


Prescriptions: 


Desloratadine [Clarinex] 5 mg PO DAILY #7


predniSONE [Deltasone] 20 mg PO QDAY #5 tab


Montelukast [Singulair] 10 mg PO QPM #14 tablet


Referrals: 


[facility] [Provider Group] - 3-5 Days

## 2022-06-28 NOTE — XRAY REPORT
CHEST 2 VIEWS 



INDICATION / CLINICAL INFORMATION: CHEST PAIN.



COMPARISON: 6/2/2022



FINDINGS:



SUPPORT DEVICES: None.

HEART / MEDIASTINUM: No significant abnormality. 

LUNGS / PLEURA: No significant pulmonary or pleural abnormality. No pneumothorax. 



ADDITIONAL FINDINGS: No significant additional findings.



IMPRESSION:

1. No acute findings.



Signer Name: Lenard Crespo DO 

Signed: 6/28/2022 11:34 PM

Workstation Name: [id number]

## 2022-06-29 NOTE — ELECTROCARDIOGRAPH REPORT
[facility]

                                       

Test Date:    2022               Test Time:    21:30:10

Pat Name:     OSKAR GUARDADO         Department:   

Patient ID:   [id number]          Room:          

Gender:       F                        Technician:   MB

:          1985               Requested By: MODE MIRELES

Order Number: [id number]              Reading MD:   Brijesh Reid

                                 Measurements

Intervals                              Axis          

Rate:         82                       P:            69

NE:           113                      QRS:          73

QRSD:         77                       T:            28

QT:           388                                    

QTc:          453                                    

                           Interpretive Statements

Sinus rhythm

Compared to ECG 10/26/2021 09:01:28

No significant changes

Electronically Signed On 2022 9:50:39 EDT by Brijesh Reid

## 2022-08-23 NOTE — ELECTROCARDIOGRAPH REPORT
[facility]

                                       

Test Date:    2022               Test Time:    02:11:55

Pat Name:     OSKAR GUARDADO         Department:   

Patient ID:   [id number]          Room:          

Gender:       F                        Technician:   NINA

:          1985               Requested By: ALEXEI HARRIS

Order Number: [id number]             Reading MD:   Cam Landeros

                                 Measurements

Intervals                              Axis          

Rate:         77                       P:            56

DC:           120                      QRS:          52

QRSD:         77                       T:            34

QT:           397                                    

QTc:          450                                    

                           Interpretive Statements

Sinus rhythm

Compared to ECG 2022 21:30:10

No significant changes

Electronically Signed On 2022 13:49:59 EDT by Cam Landeros

## 2022-11-03 ENCOUNTER — DASHBOARD ENCOUNTERS (OUTPATIENT)
Age: 37
End: 2022-11-03

## 2022-11-07 ENCOUNTER — OFFICE VISIT (OUTPATIENT)
Dept: URBAN - METROPOLITAN AREA CLINIC 17 | Facility: CLINIC | Age: 37
End: 2022-11-07

## 2022-11-07 RX ORDER — DEXLANSOPRAZOLE 60 MG/1
1 CAPSULE CAPSULE, DELAYED RELEASE ORAL ONCE A DAY
Qty: 90 | Refills: 3 | Status: ACTIVE | COMMUNITY
Start: 2020-07-01

## 2022-11-07 RX ORDER — DILTIAZEM HYDROCHLORIDE 30 MG/1
ONE TABLET TABLET, FILM COATED ORAL DAILY
Qty: 90 | Refills: 3 | Status: ACTIVE | COMMUNITY
Start: 2020-08-10

## 2022-11-07 RX ORDER — FAMOTIDINE 40 MG/1
1 TABLET TABLET, FILM COATED ORAL BID
Qty: 180 TABLET | Refills: 3 | Status: ACTIVE | COMMUNITY
Start: 2021-07-21

## 2024-11-25 ENCOUNTER — OFFICE VISIT (OUTPATIENT)
Dept: URBAN - METROPOLITAN AREA CLINIC 17 | Facility: CLINIC | Age: 39
End: 2024-11-25

## 2024-12-04 ENCOUNTER — OFFICE VISIT (OUTPATIENT)
Dept: URBAN - METROPOLITAN AREA CLINIC 17 | Facility: CLINIC | Age: 39
End: 2024-12-04

## 2025-01-10 ENCOUNTER — OFFICE VISIT (OUTPATIENT)
Dept: URBAN - METROPOLITAN AREA TELEHEALTH 2 | Facility: TELEHEALTH | Age: 40
End: 2025-01-10

## 2025-01-10 RX ORDER — DILTIAZEM HYDROCHLORIDE 30 MG/1
ONE TABLET TABLET, FILM COATED ORAL DAILY
Qty: 90 | Refills: 3 | Status: ACTIVE | COMMUNITY
Start: 2020-08-10

## 2025-01-10 RX ORDER — DEXLANSOPRAZOLE 60 MG/1
1 CAPSULE CAPSULE, DELAYED RELEASE ORAL ONCE A DAY
Qty: 90 | Refills: 3 | Status: ACTIVE | COMMUNITY
Start: 2020-07-01

## 2025-01-10 RX ORDER — FAMOTIDINE 40 MG/1
1 TABLET TABLET, FILM COATED ORAL BID
Qty: 180 TABLET | Refills: 3 | Status: ACTIVE | COMMUNITY
Start: 2021-07-21

## 2025-03-17 ENCOUNTER — OFFICE VISIT (OUTPATIENT)
Dept: URBAN - METROPOLITAN AREA CLINIC 17 | Facility: CLINIC | Age: 40
End: 2025-03-17
Payer: COMMERCIAL

## 2025-03-17 VITALS
HEIGHT: 63 IN | DIASTOLIC BLOOD PRESSURE: 82 MMHG | HEART RATE: 85 BPM | WEIGHT: 165.8 LBS | BODY MASS INDEX: 29.38 KG/M2 | TEMPERATURE: 97.9 F | SYSTOLIC BLOOD PRESSURE: 119 MMHG

## 2025-03-17 DIAGNOSIS — R63.0 ANOREXIA: ICD-10-CM

## 2025-03-17 DIAGNOSIS — K80.20 GALLSTONES: ICD-10-CM

## 2025-03-17 DIAGNOSIS — R63.8 DIETARY INDISCRETION: ICD-10-CM

## 2025-03-17 DIAGNOSIS — Z83.719 FAMILY HISTORY OF COLONIC POLYPS: ICD-10-CM

## 2025-03-17 DIAGNOSIS — R68.81 EARLY SATIETY: ICD-10-CM

## 2025-03-17 DIAGNOSIS — Z83.79 FAMILY HISTORY OF CROHN'S DISEASE: ICD-10-CM

## 2025-03-17 DIAGNOSIS — R49.0 HOARSENESS OF VOICE: ICD-10-CM

## 2025-03-17 PROBLEM — 79890006: Status: ACTIVE | Noted: 2025-03-17

## 2025-03-17 PROCEDURE — 99204 OFFICE O/P NEW MOD 45 MIN: CPT | Performed by: INTERNAL MEDICINE

## 2025-03-17 RX ORDER — DEXLANSOPRAZOLE 60 MG/1
1 CAPSULE CAPSULE, DELAYED RELEASE ORAL ONCE A DAY
Qty: 90 | Refills: 3 | Status: ACTIVE | COMMUNITY
Start: 2020-07-01

## 2025-03-17 RX ORDER — FAMOTIDINE 40 MG/1
1 TABLET TABLET, FILM COATED ORAL BID
Qty: 180 TABLET | Refills: 3 | Status: ACTIVE | COMMUNITY
Start: 2021-07-21

## 2025-03-17 RX ORDER — PANTOPRAZOLE SODIUM 40 MG/1
1 TABLET TABLET, DELAYED RELEASE ORAL ONCE A DAY
Qty: 90 TABLET | Refills: 3 | OUTPATIENT
Start: 2025-03-17

## 2025-03-17 RX ORDER — DILTIAZEM HYDROCHLORIDE 30 MG/1
ONE TABLET TABLET, FILM COATED ORAL DAILY
Qty: 90 | Refills: 3 | Status: ACTIVE | COMMUNITY
Start: 2020-08-10

## 2025-03-17 NOTE — HPI-TODAY'S VISIT:
Thej patient has a longstanding history of gallstones, filling up early and upper abdominal fullness as she is s/p abd us + for gallstones and GERD and  gastritis on EGD. She notes that she willl eat diinner late after 6pm and she notes that she was on Miralax inthe past and has struggled with constipaton as she hss discontinued Mirlaax. She is not alot of high fiber foods as well. She denies mlena, hematemeksis or heatochezia. She has s family history of colon polyps with her mother and Crohn's dz with her sister (older). She is sp nasal polyp surgery in 2022 and she has not been on PPI recetnky as she was on Dexilant in the past.   The patient's time of visit DOS is 45 minutes after revewi of the old records and op notes

## 2025-04-08 ENCOUNTER — OFFICE VISIT (OUTPATIENT)
Dept: URBAN - METROPOLITAN AREA SURGERY CENTER 16 | Facility: SURGERY CENTER | Age: 40
End: 2025-04-08

## 2025-04-09 ENCOUNTER — CLAIMS CREATED FROM THE CLAIM WINDOW (OUTPATIENT)
Dept: URBAN - METROPOLITAN AREA SURGERY CENTER 16 | Facility: SURGERY CENTER | Age: 40
End: 2025-04-09
Payer: COMMERCIAL

## 2025-04-09 ENCOUNTER — CLAIMS CREATED FROM THE CLAIM WINDOW (OUTPATIENT)
Dept: URBAN - METROPOLITAN AREA CLINIC 4 | Facility: CLINIC | Age: 40
End: 2025-04-09
Payer: COMMERCIAL

## 2025-04-09 DIAGNOSIS — K20.80 ESOPHAGITIS, LOS ANGELES GRADE B: ICD-10-CM

## 2025-04-09 DIAGNOSIS — K29.70 GASTRITIS, UNSPECIFIED, WITHOUT BLEEDING: ICD-10-CM

## 2025-04-09 DIAGNOSIS — K21.9 GASTRIC REFLUX: ICD-10-CM

## 2025-04-09 DIAGNOSIS — K31.89 OTHER DISEASES OF STOMACH AND DUODENUM: ICD-10-CM

## 2025-04-09 DIAGNOSIS — K21.9 GASTRO-ESOPHAGEAL REFLUX DISEASE WITHOUT ESOPHAGITIS: ICD-10-CM

## 2025-04-09 DIAGNOSIS — K30 DYSPEPSIA: ICD-10-CM

## 2025-04-09 DIAGNOSIS — K21.9 ACID REFLUX: ICD-10-CM

## 2025-04-09 DIAGNOSIS — K31.89 REACTIVE GASTROPATHY: ICD-10-CM

## 2025-04-09 PROCEDURE — 88313 SPECIAL STAINS GROUP 2: CPT | Performed by: PATHOLOGY

## 2025-04-09 PROCEDURE — 88305 TISSUE EXAM BY PATHOLOGIST: CPT | Performed by: PATHOLOGY

## 2025-04-09 PROCEDURE — 00731 ANES UPR GI NDSC PX NOS: CPT | Performed by: NURSE ANESTHETIST, CERTIFIED REGISTERED

## 2025-04-09 PROCEDURE — 43239 EGD BIOPSY SINGLE/MULTIPLE: CPT | Performed by: INTERNAL MEDICINE

## 2025-04-09 PROCEDURE — 00731 ANES UPR GI NDSC PX NOS: CPT | Performed by: ANESTHESIOLOGY

## 2025-04-09 PROCEDURE — 88312 SPECIAL STAINS GROUP 1: CPT | Performed by: PATHOLOGY

## 2025-04-09 RX ORDER — DEXLANSOPRAZOLE 60 MG/1
1 CAPSULE CAPSULE, DELAYED RELEASE ORAL ONCE A DAY
Qty: 90 | Refills: 3 | Status: ACTIVE | COMMUNITY
Start: 2020-07-01

## 2025-04-09 RX ORDER — PANTOPRAZOLE SODIUM 40 MG/1
1 TABLET TABLET, DELAYED RELEASE ORAL ONCE A DAY
Qty: 90 TABLET | Refills: 3 | Status: ACTIVE | COMMUNITY
Start: 2025-03-17

## 2025-04-09 RX ORDER — FAMOTIDINE 40 MG/1
1 TABLET TABLET, FILM COATED ORAL BID
Qty: 180 TABLET | Refills: 3 | Status: ACTIVE | COMMUNITY
Start: 2021-07-21

## 2025-04-09 RX ORDER — DILTIAZEM HYDROCHLORIDE 30 MG/1
ONE TABLET TABLET, FILM COATED ORAL DAILY
Qty: 90 | Refills: 3 | Status: ACTIVE | COMMUNITY
Start: 2020-08-10

## 2025-07-17 ENCOUNTER — OFFICE VISIT (OUTPATIENT)
Dept: URBAN - METROPOLITAN AREA CLINIC 105 | Facility: CLINIC | Age: 40
End: 2025-07-17

## 2025-07-17 RX ORDER — FAMOTIDINE 40 MG/1
1 TABLET TABLET, FILM COATED ORAL BID
Qty: 180 TABLET | Refills: 3 | Status: ACTIVE | COMMUNITY
Start: 2021-07-21

## 2025-07-17 RX ORDER — PANTOPRAZOLE SODIUM 40 MG/1
1 TABLET TABLET, DELAYED RELEASE ORAL ONCE A DAY
Qty: 90 TABLET | Refills: 3 | Status: ACTIVE | COMMUNITY
Start: 2025-03-17

## 2025-07-17 RX ORDER — DILTIAZEM HYDROCHLORIDE 30 MG/1
ONE TABLET TABLET, FILM COATED ORAL DAILY
Qty: 90 | Refills: 3 | Status: ACTIVE | COMMUNITY
Start: 2020-08-10

## 2025-07-17 RX ORDER — DEXLANSOPRAZOLE 60 MG/1
1 CAPSULE CAPSULE, DELAYED RELEASE ORAL ONCE A DAY
Qty: 90 | Refills: 3 | Status: ACTIVE | COMMUNITY
Start: 2020-07-01

## 2025-08-06 ENCOUNTER — OFFICE VISIT (OUTPATIENT)
Dept: URBAN - METROPOLITAN AREA CLINIC 17 | Facility: CLINIC | Age: 40
End: 2025-08-06

## 2025-08-06 RX ORDER — PANTOPRAZOLE SODIUM 40 MG/1
1 TABLET TABLET, DELAYED RELEASE ORAL ONCE A DAY
Qty: 90 TABLET | Refills: 3 | COMMUNITY
Start: 2025-03-17

## 2025-08-06 RX ORDER — DILTIAZEM HYDROCHLORIDE 30 MG/1
ONE TABLET TABLET, FILM COATED ORAL DAILY
Qty: 90 | Refills: 3 | COMMUNITY
Start: 2020-08-10

## 2025-08-06 RX ORDER — FAMOTIDINE 40 MG/1
1 TABLET TABLET, FILM COATED ORAL BID
Qty: 180 TABLET | Refills: 3 | COMMUNITY
Start: 2021-07-21

## 2025-08-06 RX ORDER — DEXLANSOPRAZOLE 60 MG/1
1 CAPSULE CAPSULE, DELAYED RELEASE ORAL ONCE A DAY
Qty: 90 | Refills: 3 | COMMUNITY
Start: 2020-07-01

## 2025-08-20 ENCOUNTER — OFFICE VISIT (OUTPATIENT)
Dept: URBAN - METROPOLITAN AREA CLINIC 17 | Facility: CLINIC | Age: 40
End: 2025-08-20

## 2025-08-20 RX ORDER — FAMOTIDINE 40 MG/1
1 TABLET TABLET, FILM COATED ORAL BID
Qty: 180 TABLET | Refills: 3 | Status: ACTIVE | COMMUNITY
Start: 2021-07-21

## 2025-08-20 RX ORDER — PANTOPRAZOLE SODIUM 40 MG/1
1 TABLET TABLET, DELAYED RELEASE ORAL ONCE A DAY
Qty: 90 TABLET | Refills: 3 | Status: ACTIVE | COMMUNITY
Start: 2025-03-17

## 2025-08-20 RX ORDER — DILTIAZEM HYDROCHLORIDE 30 MG/1
ONE TABLET TABLET, FILM COATED ORAL DAILY
Qty: 90 | Refills: 3 | Status: ACTIVE | COMMUNITY
Start: 2020-08-10

## 2025-08-20 RX ORDER — DEXLANSOPRAZOLE 60 MG/1
1 CAPSULE CAPSULE, DELAYED RELEASE ORAL ONCE A DAY
Qty: 90 | Refills: 3 | Status: ACTIVE | COMMUNITY
Start: 2020-07-01